# Patient Record
Sex: MALE | Race: WHITE | HISPANIC OR LATINO | Employment: FULL TIME | URBAN - METROPOLITAN AREA
[De-identification: names, ages, dates, MRNs, and addresses within clinical notes are randomized per-mention and may not be internally consistent; named-entity substitution may affect disease eponyms.]

---

## 2020-05-29 ENCOUNTER — HOSPITAL ENCOUNTER (OUTPATIENT)
Dept: LAB | Facility: MEDICAL CENTER | Age: 45
End: 2020-05-29
Attending: FAMILY MEDICINE
Payer: COMMERCIAL

## 2020-05-29 ENCOUNTER — OFFICE VISIT (OUTPATIENT)
Dept: URGENT CARE | Facility: PHYSICIAN GROUP | Age: 45
End: 2020-05-29
Payer: COMMERCIAL

## 2020-05-29 VITALS
DIASTOLIC BLOOD PRESSURE: 110 MMHG | RESPIRATION RATE: 16 BRPM | SYSTOLIC BLOOD PRESSURE: 154 MMHG | OXYGEN SATURATION: 97 % | TEMPERATURE: 98.3 F | HEART RATE: 72 BPM | WEIGHT: 239 LBS

## 2020-05-29 DIAGNOSIS — I10 ESSENTIAL HYPERTENSION: ICD-10-CM

## 2020-05-29 LAB
ALBUMIN SERPL BCP-MCNC: 4.4 G/DL (ref 3.2–4.9)
ALBUMIN/GLOB SERPL: 1.7 G/DL
ALP SERPL-CCNC: 146 U/L (ref 30–99)
ALT SERPL-CCNC: 49 U/L (ref 2–50)
ANION GAP SERPL CALC-SCNC: 13 MMOL/L (ref 7–16)
AST SERPL-CCNC: 28 U/L (ref 12–45)
BASOPHILS # BLD AUTO: 0.4 % (ref 0–1.8)
BASOPHILS # BLD: 0.03 K/UL (ref 0–0.12)
BILIRUB SERPL-MCNC: 0.8 MG/DL (ref 0.1–1.5)
BUN SERPL-MCNC: 12 MG/DL (ref 8–22)
CALCIUM SERPL-MCNC: 9 MG/DL (ref 8.5–10.5)
CHLORIDE SERPL-SCNC: 99 MMOL/L (ref 96–112)
CO2 SERPL-SCNC: 23 MMOL/L (ref 20–33)
CREAT SERPL-MCNC: 0.78 MG/DL (ref 0.5–1.4)
EOSINOPHIL # BLD AUTO: 0.08 K/UL (ref 0–0.51)
EOSINOPHIL NFR BLD: 1 % (ref 0–6.9)
ERYTHROCYTE [DISTWIDTH] IN BLOOD BY AUTOMATED COUNT: 40.8 FL (ref 35.9–50)
GLOBULIN SER CALC-MCNC: 2.6 G/DL (ref 1.9–3.5)
GLUCOSE SERPL-MCNC: 296 MG/DL (ref 65–99)
HCT VFR BLD AUTO: 42.7 % (ref 42–52)
HGB BLD-MCNC: 15 G/DL (ref 14–18)
IMM GRANULOCYTES # BLD AUTO: 0.02 K/UL (ref 0–0.11)
IMM GRANULOCYTES NFR BLD AUTO: 0.2 % (ref 0–0.9)
LYMPHOCYTES # BLD AUTO: 3.09 K/UL (ref 1–4.8)
LYMPHOCYTES NFR BLD: 38.1 % (ref 22–41)
MCH RBC QN AUTO: 32.2 PG (ref 27–33)
MCHC RBC AUTO-ENTMCNC: 35.1 G/DL (ref 33.7–35.3)
MCV RBC AUTO: 91.6 FL (ref 81.4–97.8)
MONOCYTES # BLD AUTO: 0.49 K/UL (ref 0–0.85)
MONOCYTES NFR BLD AUTO: 6 % (ref 0–13.4)
NEUTROPHILS # BLD AUTO: 4.39 K/UL (ref 1.82–7.42)
NEUTROPHILS NFR BLD: 54.3 % (ref 44–72)
NRBC # BLD AUTO: 0 K/UL
NRBC BLD-RTO: 0 /100 WBC
PLATELET # BLD AUTO: 185 K/UL (ref 164–446)
PMV BLD AUTO: 10.6 FL (ref 9–12.9)
POTASSIUM SERPL-SCNC: 3.6 MMOL/L (ref 3.6–5.5)
PROT SERPL-MCNC: 7 G/DL (ref 6–8.2)
RBC # BLD AUTO: 4.66 M/UL (ref 4.7–6.1)
SODIUM SERPL-SCNC: 135 MMOL/L (ref 135–145)
TSH SERPL DL<=0.005 MIU/L-ACNC: 0.87 UIU/ML (ref 0.38–5.33)
WBC # BLD AUTO: 8.1 K/UL (ref 4.8–10.8)

## 2020-05-29 PROCEDURE — 83036 HEMOGLOBIN GLYCOSYLATED A1C: CPT

## 2020-05-29 PROCEDURE — 99204 OFFICE O/P NEW MOD 45 MIN: CPT | Performed by: FAMILY MEDICINE

## 2020-05-29 PROCEDURE — 84443 ASSAY THYROID STIM HORMONE: CPT

## 2020-05-29 PROCEDURE — 36415 COLL VENOUS BLD VENIPUNCTURE: CPT

## 2020-05-29 PROCEDURE — 93000 ELECTROCARDIOGRAM COMPLETE: CPT | Performed by: FAMILY MEDICINE

## 2020-05-29 PROCEDURE — 80053 COMPREHEN METABOLIC PANEL: CPT

## 2020-05-29 PROCEDURE — 85025 COMPLETE CBC W/AUTO DIFF WBC: CPT

## 2020-05-29 RX ORDER — HYDROCHLOROTHIAZIDE 25 MG/1
25 TABLET ORAL DAILY
Qty: 30 TAB | Refills: 0 | Status: SHIPPED | OUTPATIENT
Start: 2020-05-29 | End: 2020-06-12 | Stop reason: SDUPTHER

## 2020-05-29 NOTE — PROGRESS NOTES
Subjective:      Chief Complaint   Patient presents with   • Hypertension Follow-up     has had a couple episodes of feeling sick, high blood pressure today                Hypertension  This is a chronic problem.   He is not really sure when it started, but he has been checking BP at home for past couple of months and readings have been elevated.        He has no hx of HTN.      Associated symptoms include : occasional nausea.          Pertinent negatives include no anxiety, blurred vision, chest pain, malaise/fatigue, orthopnea, palpitations or shortness of breath. There are no associated agents to hypertension. There are no known risk factors for coronary artery disease.         There is no history of angina, kidney disease or CAD/MI.       Social History     Socioeconomic History   • Marital status: Single     Spouse name: Not on file   • Number of children: Not on file   • Years of education: Not on file   • Highest education level: Not on file   Occupational History   • Not on file   Social Needs   • Financial resource strain: Not on file   • Food insecurity     Worry: Not on file     Inability: Not on file   • Transportation needs     Medical: Not on file     Non-medical: Not on file   Tobacco Use   • Smoking status: Former Smoker   • Smokeless tobacco: Never Used   Substance and Sexual Activity   • Alcohol use: Not on file   • Drug use: Not on file   • Sexual activity: Not on file   Lifestyle   • Physical activity     Days per week: Not on file     Minutes per session: Not on file   • Stress: Not on file   Relationships   • Social connections     Talks on phone: Not on file     Gets together: Not on file     Attends [employer] service: Not on file     Active member of club or organization: Not on file     Attends meetings of clubs or organizations: Not on file     Relationship status: Not on file   • Intimate partner violence     Fear of current or ex partner: Not on file     Emotionally abused: Not on file      Physically abused: Not on file     Forced sexual activity: Not on file   Other Topics Concern   • Not on file   Social History Narrative   • Not on file       No current outpatient medications on file prior to visit.     No current facility-administered medications on file prior to visit.          Past medical history was unremarkable and not pertinent to current issue  Family history was reviewed and not pertinent             Review of Systems   Constitutional: Negative for fever, chills and malaise/fatigue.   Eyes: Negative for vision changes, d/c.    Respiratory: Negative for cough and sputum production.    Cardiovascular: Negative for chest pain and palpitations.   Gastrointestinal: Negative for   vomiting, abdominal pain, diarrhea and constipation.   Genitourinary: Negative for dysuria, urgency and frequency.   Skin: Negative for rash or  itching.   Neurological: Negative for dizziness and tingling.   Psychiatric/Behavioral: Negative for depression.   Hematologic/lymphatic - denies bruising or excessive bleeding  All other systems reviewed and are negative.         Objective:     /110 (BP Location: Right arm, Patient Position: Sitting, BP Cuff Size: Large adult)   Pulse 72   Temp 36.8 °C (98.3 °F) (Temporal)   Resp 16   Wt 108.4 kg (239 lb)   SpO2 97%       Physical Exam   Constitutional: pt is oriented to person, place, and time. Pt appears well-developed and well-nourished. No distress.   HENT:   Head: Normocephalic and atraumatic.   Mouth/Throat:   Eyes: Conjunctivae and EOM are normal. Pupils are equal, round, and reactive to light. Right eye exhibits no discharge. Left eye exhibits no discharge. No scleral icterus.   Cardiovascular: Normal rate, regular rhythm, normal heart sounds and intact distal pulses.  Exam reveals no friction rub.    No murmur heard.  Pulmonary/Chest: Effort normal and breath sounds normal. No respiratory distress. Pt has no wheezes. Pt has no rales.   Neurological: pt is  alert and oriented to person, place, and time. No cranial nerve deficit.   Skin: Skin is warm. Pt is not diaphoretic. No erythema.   Psychiatric: pt behavior is normal.   Nursing note and vitals reviewed.         EKG interpretation - normal sinus rhythm.   + LVH noted.  No ST or QRS morphology changes. QT interval is normal.  No signs of ischemia.       Assessment/Plan:       1. Essential hypertension  Not at goal    Will start on HCTZ    Labs ordered    F/u PCP in one week        - hydroCHLOROthiazide (HYDRODIURIL) 25 MG Tab; Take 1 Tab by mouth every day.  Dispense: 30 Tab; Refill: 0  - CBC WITH DIFFERENTIAL; Future  - Comp Metabolic Panel; Future  - TSH; Future  - HEMOGLOBIN A1C; Future  - REFERRAL TO FOLLOW-UP WITH PRIMARY CARE  - EKG - Clinic Performed

## 2020-05-29 NOTE — PATIENT INSTRUCTIONS
Controle patel presión arterial  (Managing Your Hypertension)  La presión arterial es la medida de la fuerza de la rosas al presionar contra las smith de las arterias. Las arterias son tubos musculares que están dentro del sistema circulatorio. La presión arterial no es xenia. Se eleva con la actividad, la excitación o el nerviosismo y disminuye xander el sueño y la relajación. Si los valores de medición de la presión arterial se mantienen por arriba de lo normal por mucho tiempo, hay riesgo de tener problemas de annette. La presión arterial wendi (hipertensión) es shalini enfermedad de larga duración (crónica) en la que la presión arterial está elevada.   La lectura de la presión arterial se registra con dos números, por ejemplo 120 sobre 80 (o 120/80). El primer número, el más alto, es la presión sistólica. Es la medida de la presión de las arterias cuando el corazón late. El jennifer número, el más bajo, es la presión diastólica. Es la medida de la presión en las arterias cuando el corazón se relaja entre latidos.   Es importante mantener la presión arterial en un rango normal para prevenir la annette en general y otros problemas de annette, mu enfermedades del corazón e ictus. Cuando no se controla la presión arterial, el corazón trabaja más de lo normal. La hipertensión arterial es shalini enfermedad muy común en los adultos debido a que tiende a aumentar con la edad. Hombres y mujeres son igualmente propensos a tener hipertensión, lana en diferentes momentos de la phyllis. Antes de los 45 años, los hombres son más propensos a sufrir hipertensión. Después de 65 años de edad, las mujeres tienen más probabilidades de padecerla. La hipertensión es muy común en los afroamericanos. Esta enfermedad generalmente no manifiesta signos ni síntomas. Generalmente se desconoce la causa. El médico podrá indicarle un plan para mantener la presión arterial en un rango normal y saludable.   ETAPAS DE PRESIÓN ARTERIAL   La presión arterial  se clasifica en cuatro etapas: normal, prehipertensión, etapa 1 y etapa 2. Se puede leer la presión arterial para determinar qué tipo de tratamiento, si se indicara, es necesario. Las opciones apropiadas para el tratamiento están vinculadas a estas cuatro etapas:   Normal   · Presión sistólica (mm Hg): por debajo de 120.  · Presión diastólica (mm Hg): por debajo de 80.  Prehipertensión   · Presión sistólica (mm Hg): 120 a 139.  · Presión diastólica (mm Hg): 80 a 89.  Etapa1   · Presión sistólica (mm Hg): 140 a 159.  · Presión diastólica (mm Hg): 90 a 99.  Etapa2   · Presión sistólica (mm Hg): 160 o más.  · Presión diastólica (mm Hg): 100 o más.  RIESGOS RELACIONADOS CON LA PRESIÓN ARTERIAL RANJAN   Controlar la presión arterial es shalini responsabilidad importante. La hipertensión no controlada puede llevar a:   · Ataques cardíacos.  · Ictus.  · Debilitamiento de los vasos sanguíneos (aneurisma).  · Insuficiencia cardíaca.  · Daño renal.  · Daño ocular.  · Síndrome metabólico.  · Problemas de memoria y concentración.  CÓMO CONTROLAR LA PRESIÓN ARTERIAL   La presión arterial puede ser controlada efectivamente con cambios en el estilo de phyllis y de medicamentos (si es necesario). El médico le indicará un plan para bajar la presión arterial al rango normal. Abdi plan debería incluir lo siguiente:   Educación   · Alexandra toda la información proporcionada por ryan médicos acerca de cómo controlar la presión arterial.  · Infórmese sobre las últimas recomendaciones de pautas y tratamiento. Continuamente se hacen nuevas investigaciones para definir con más precisión los riesgos y los tratamientos para la hipertensión arterial.  Cambiosen el estilo de phyllis  · Control del peso.  · No fumar.  · Mantenerse físicamente activo.  · Disminuir la cantidad de sal de la dieta.  · Reducir las situaciones de estrés.  · Controlar las enfermedades crónicas, mu el colesterol alto o la diabetes.  · Reducir el consumo de alcohol.  Medicamentos  · Están  disponibles diferentes medicamentos (medicamentos antihipertensivos) para que la presión arterial quede dentro de un rango normal.  Comunicación   · Revise con patel médico todos los medicamentos que mark ya que puede guerline efectos secundarios o interacciones.  · Hable con patel médico acerca de la dieta, hábitos de ejercicio y otros factores del estilo de phyllis que pueden contribuir a la hipertensión arterial.  · Concurra regularmente a la consulta con el profesional. El médico puede ayudarle a crear y ajustar patel plan para controlar la presión arterial wendi.  RECOMENDACIONES PARA EL TRATAMIENTO Y CONTROL   Las siguientes recomendaciones se basan en las pautas actuales para controlar la hipertensión arterial en adultos no gestantes. Utilice estas recomendaciones para determinar el período de seguimiento adecuado o la opción de tratamiento basada en la lectura de patel presión arterial. Podrá conversar sobre estas opciones con patel médico.   · Presión sistólica de 120 a 139 o presión diastólica de 80 a 89: Concurra a las visitas de control, según las indicaciones.  · Presión sistólica de 140 a 160 o presión diastólica de 90 a 100: Elton shalini visita de control con el profesional dentro de dos meses.  · Presión sistólica por arriba de 160 o presión diastólica por arriba de 100: Elton shalini visita de control con el profesional dentro de un mes.  · Presión sistólica por arriba de 180 o presión diastólica por arriba de 110: Considere la posibilidad de seguir shalini terapia antihipertensiva; concurra a shalini visita de control con patel médico dentro de 1 semana.  · Presión sistólica por arriba de 200 o presión diastólica por arriba de 120: Comience el tratamiento antihipertensivo; concurra shalini visita de control con patel médico dentro de 1 semana.  Esta información no tiene mu fin reemplazar el consejo del médico. Asegúrese de hacerle al médico cualquier pregunta que tenga.  Document Released: 09/11/2013  [employer] Interactive Patient Education ©  2017 [employer]

## 2020-05-30 DIAGNOSIS — E11.9 TYPE 2 DIABETES MELLITUS WITHOUT COMPLICATION, UNSPECIFIED WHETHER LONG TERM INSULIN USE (HCC): ICD-10-CM

## 2020-05-30 LAB
EST. AVERAGE GLUCOSE BLD GHB EST-MCNC: 246 MG/DL
HBA1C MFR BLD: 10.2 % (ref 0–5.6)

## 2020-05-30 NOTE — PROGRESS NOTES
Labs reviewed:       Pt is diabetic - A1c is elevated.       Referred to endocrinology for treatment.   Hopefully he can be seen soon.     In the meantime, Please advise pt to come to urgent care sooner for:   Increased thirst, confusion/disorientation,  very frequent urination, severe fatigue

## 2020-06-03 ENCOUNTER — TELEPHONE (OUTPATIENT)
Dept: URGENT CARE | Facility: CLINIC | Age: 45
End: 2020-06-03

## 2020-06-03 NOTE — TELEPHONE ENCOUNTER
Please call pt:      Labs reviewed:       Pt is diabetic - A1c is elevated.       Referred to endocrinology for treatment.   Hopefully he can be seen soon.     In the meantime, Please advise pt to come to urgent care sooner for:   Increased thirst, confusion/disorientation,  very frequent urination, severe fatigue

## 2020-06-04 NOTE — TELEPHONE ENCOUNTER
Spoke with pt's daughter regarding referrals and what the process should be. Pt and daughter understood. They will call Endo and Primary to try and schedule ASAP.

## 2020-06-09 ENCOUNTER — TELEPHONE (OUTPATIENT)
Dept: SCHEDULING | Facility: IMAGING CENTER | Age: 45
End: 2020-06-09

## 2020-06-12 ENCOUNTER — OFFICE VISIT (OUTPATIENT)
Dept: MEDICAL GROUP | Facility: MEDICAL CENTER | Age: 45
End: 2020-06-12
Payer: COMMERCIAL

## 2020-06-12 VITALS
RESPIRATION RATE: 16 BRPM | HEART RATE: 96 BPM | HEIGHT: 71 IN | TEMPERATURE: 98.5 F | WEIGHT: 235.23 LBS | BODY MASS INDEX: 32.93 KG/M2 | SYSTOLIC BLOOD PRESSURE: 126 MMHG | DIASTOLIC BLOOD PRESSURE: 80 MMHG | OXYGEN SATURATION: 97 %

## 2020-06-12 DIAGNOSIS — Z11.4 SCREENING FOR HIV (HUMAN IMMUNODEFICIENCY VIRUS): ICD-10-CM

## 2020-06-12 DIAGNOSIS — I10 ESSENTIAL HYPERTENSION: ICD-10-CM

## 2020-06-12 DIAGNOSIS — R74.8 ELEVATED ALKALINE PHOSPHATASE LEVEL: ICD-10-CM

## 2020-06-12 DIAGNOSIS — E11.9 TYPE 2 DIABETES MELLITUS WITHOUT COMPLICATION, WITHOUT LONG-TERM CURRENT USE OF INSULIN (HCC): ICD-10-CM

## 2020-06-12 DIAGNOSIS — Z13.21 ENCOUNTER FOR VITAMIN DEFICIENCY SCREENING: ICD-10-CM

## 2020-06-12 DIAGNOSIS — B35.3 TINEA PEDIS OF BOTH FEET: ICD-10-CM

## 2020-06-12 DIAGNOSIS — Z23 NEED FOR VACCINATION: ICD-10-CM

## 2020-06-12 DIAGNOSIS — E66.9 CLASS 1 OBESITY WITHOUT SERIOUS COMORBIDITY WITH BODY MASS INDEX (BMI) OF 33.0 TO 33.9 IN ADULT, UNSPECIFIED OBESITY TYPE: ICD-10-CM

## 2020-06-12 PROBLEM — E66.811 CLASS 1 OBESITY WITH BODY MASS INDEX (BMI) OF 33.0 TO 33.9 IN ADULT: Status: ACTIVE | Noted: 2020-06-12

## 2020-06-12 PROCEDURE — 90472 IMMUNIZATION ADMIN EACH ADD: CPT | Performed by: INTERNAL MEDICINE

## 2020-06-12 PROCEDURE — 99204 OFFICE O/P NEW MOD 45 MIN: CPT | Mod: 25 | Performed by: INTERNAL MEDICINE

## 2020-06-12 PROCEDURE — 90715 TDAP VACCINE 7 YRS/> IM: CPT | Performed by: INTERNAL MEDICINE

## 2020-06-12 PROCEDURE — 90471 IMMUNIZATION ADMIN: CPT | Performed by: INTERNAL MEDICINE

## 2020-06-12 PROCEDURE — 90732 PPSV23 VACC 2 YRS+ SUBQ/IM: CPT | Performed by: INTERNAL MEDICINE

## 2020-06-12 RX ORDER — GLIPIZIDE 5 MG/1
5 TABLET ORAL DAILY
Qty: 90 TAB | Refills: 0 | Status: SHIPPED | OUTPATIENT
Start: 2020-06-12 | End: 2020-08-10

## 2020-06-12 RX ORDER — LANCETS 30 GAUGE
EACH MISCELLANEOUS
Qty: 100 EACH | Refills: 2 | Status: SHIPPED | OUTPATIENT
Start: 2020-06-12 | End: 2020-07-14 | Stop reason: SDUPTHER

## 2020-06-12 RX ORDER — KETOCONAZOLE 20 MG/G
1 CREAM TOPICAL DAILY
Qty: 1 TUBE | Refills: 1 | Status: SHIPPED | OUTPATIENT
Start: 2020-06-12 | End: 2020-06-29

## 2020-06-12 RX ORDER — HYDROCHLOROTHIAZIDE 25 MG/1
25 TABLET ORAL DAILY
Qty: 30 TAB | Refills: 0 | Status: SHIPPED | OUTPATIENT
Start: 2020-06-12 | End: 2020-07-14

## 2020-06-12 RX ORDER — GLUCOSAMINE HCL/CHONDROITIN SU 500-400 MG
CAPSULE ORAL
Qty: 100 EACH | Refills: 2 | Status: SHIPPED | OUTPATIENT
Start: 2020-06-12 | End: 2021-01-14 | Stop reason: SDUPTHER

## 2020-06-12 ASSESSMENT — PATIENT HEALTH QUESTIONNAIRE - PHQ9: CLINICAL INTERPRETATION OF PHQ2 SCORE: 0

## 2020-06-12 ASSESSMENT — FIBROSIS 4 INDEX: FIB4 SCORE: .972972972972972973

## 2020-06-12 NOTE — ASSESSMENT & PLAN NOTE
DASH diet  HCTZ  Monitor BP  Denies any chest pain, shortness of breath, leg swelling, lightheadedness, dizziness.

## 2020-06-12 NOTE — PATIENT INSTRUCTIONS
Diabetes, cuide la annette de patel corazón y ryan vasos sanguíneos  (Diabetes, Keeping Your Heart and Blood Vessels Healthy)  Demasiada glucosa (azúcar) en la rosas xander un tiempo prolongado, puede traerle problemas. El nivel de glucosa elevado puede dañar muchas partes del organismo, mu el corazón, los vasos sanguíneos y los riñones. La enfermedad del corazón y de los vasos sanguíneos puede ocasionar infartos e ictus, shalini de las causas principales de muerte en las personas con diabetes. Hay varias cosas que puede hacer para disminuir o evitar las complicaciones de la diabetes.  ¿CUÁL ES LA FUNCIÓN DEL CORAZÓN Y DE LOS VASOS SANGUÍNEOS?  El corazón y los vasos sanguíneos rebecca el aparato circulatorio. El corazón es un gran músculo que bombea la rosas a través del cuerpo. El corazón bombea la rosas, y ésta lleva el oxígeno a los vasos sanguíneos más grandes, denominados arterias, y a los pequeños vasos, que llamamos capilares. Otros vasos sanguíneos, las venas, llevan la rosas nuevamente hasta el corazón  ¿CÓMO SE OBSTRUYEN LOS VASOS SANGUÍNEOS?  Varias cosas, entre ellas la diabetes, pueden hacer que patel nivel de colesterol en rosas aumente. El colesterol es shalini sustancia que forma el organismo y que se utiliza para muchas funciones importantes. También se encuentra en algunos alimentos de origen animal. Cuando el colesterol está demasiado elevado el interior de los grandes vasos sanguíneos se estrecha, y hasta se obstruye. Los vasos sanguíneos que se traylor estrechado y bloqueado hacen que sea más difícil el paso de la rosas hacia otras partes del organismo. Kristy trastorno se denomina aterosclerosis.  ¿QUÉ OCURRE CUANDO LOS VASOS SANGUÍNEOS SE OBSTRUYEN?  Cuando las arterias se estrechan y se obstruyen, puede tener problemas cardíacos y tiene mayor riesgo de sufrir ataques cardíacos o ictus.  · La angina ocasiona dolor en el pecho, brazos, hombros o espalda. Puede sentir el dolor cuando patel corazón late rápido,  mu cuando hace ejercicios. El dolor puede desaparecer con el descanso. También podrá sentirse muy débil y sudoroso. Si no comienza con un tratamiento, el dolor aparecerá más a menudo. Si la diabetes ha dañado los nervios del corazón, podría no sentir dolor en el pecho.   · Ataques cardíacos. Un infarto se produce cuando un vaso sanguíneo que se encuentra en el corazón o cerca de éste, se obstruye. Shalini parte del músculo cardíaco no recibe la cantidad de rosas suficiente y la asha se ve privada de oxìgeno, por lo tanto se produce un daño permanente.   ¿CUÁLES SON LAS SEÑALES DE ALERTA DE UN INFARTO?  Usted puede tener shalini o más de las siguientes señales de alerta:  · Dolor o molestias en el pecho   · Dolor o molestias en los brazos, espalda, mandíbula o favian.   · Indigestión o dolor de estómago   · Falta de aire.   · Sudoración   · Náuseas o vómitos.   · Mareos   · También puede ocurrir que no tenga ninguna señal de alerta, o que aparezca y se vaya.   ¿DE QUÉ MODO LA ENFERMEDAD CARDÍACA PRODUCE PRESIÓN ARTERIAL ELEVADA?  Los vasos sanguíneos que se traylor estrechado maria dolores shalini pequeña abertura para que la rosas fluya Es mu abrir shalini manguera y mantener el pulgar sobre la abertura La abertura pequeña hace que el agua salga con más presión Del mismo modo, los vasos sanguíneos que se traylor estrechado conducen a un aumento de la presión arterial. Otros factores, mu problemas renales y el sobrepeso, también pueden conducir a un aumento de la presión arterial  Muchas personas diabéticas también tienen la presión arterial elevada. Si usted tiene problemas en el corazón, en la vista o en los riñones debido a la diabetes, la presión arterial elevada puede empeorarlos.  Si tiene la presión arterial elevada, consulte al profesional que lo asiste sobre mu bajarla. El profesional podrá aconsejarle que tome un medicamento para la presión todos los días. Algunos tipos de medicamentos para la presión arterial mantendrán ryan  riñones sanos.  Para disminuir la presión arterial, el profesional también podrá recomendarle que baje de peso, que consuma más frutas y verduras, menos sal y alimentos ricos en sodio, mu sopas enlatadas, fiambres, colaciones que contengan mucha sal y comidas rápidas, y que sima menos alcohol.  ¿CUÁLES SON LAS SEÑALES DE ALERTA DE UN ICTUS?  El ictus se produce cuando shalini parte del cerebro no recibe la rosas suficiente y sonny de funcionar. Según la parte del cerebro que se dañe, el ictus puede causar:  · Debilidad o adormecimiento súbito de la juana, el brazo o la pierna, de un lado del cuerpo.   · Confusión súbita, problemas para hablar o para comprender.   · Mareo, falta de equilibrio o problemas para caminar   · Dificultad para la visión en zaire o en ambos ojos o visión doble.   · Dolor de jc repentino e intenso   Muchas veces, zaire o más de estos signos de alerta pueden presentarse y desaparecer. Podría tener un ataque isquémico transitorio (AIT). Si presenta alguno de estos síntomas, consulte inmediatamente con el profesional que lo asiste.  ¿DE QUÉ MODO SHALINI OBSTRUCCIÓN EN LOS VASOS SANGUÍNEOS PUEDE LESIONAR LAS PIERNAS Y LOS PIES?  La enfermedad vascular periférica puede aparecer cuando la katy de los vasos sanguíneos se estrecha y no pasa suficiente cantidad de rosas hacia las piernas y los pies. Puede sentir dolor en los glúteos, la parte posterior de las piernas o en los muslos al estar parado, caminar o practicar ejercicios. En algunos casos será necesario someterse a shalini cirugía.  ¿QUÉ PUEDO HACER PARA MANTENER LOS VASOS SANGUÍNEOS SANOS Y PREVENIR LA ENFERMEDAD CARDÍACA Y EL ICTUS?  · Mantenga el nivel de glucosa en rosas bajo control. Probablemente el médico le indique un análisis de rosas A1c al menos dos veces al año. La prueba A1c informa cuál fue el promedio del nivel de glucosa en rosas en los últimos 2 ó 3 meses y puede darle información valiosa acerca del control global de patel diabetes.  "  · Mantenga la presión arterial bajo control. Elton que se la controlen en cada consulta médica. Si mark medicamentos para controlar la presión arterial, tómelos exactamente según se los traylor prescripto. Para la mayor parte de las personas, el objetivo es tener menos de 130/80.   · Mantenga el colesterol bajo control. Contrólelo al menos shalini vez al año. El objetivo para la mayor parte de las personas es   · LDL colesterol (rob) menos de 100 mg/dl.   · HDL colesterol (armas) más de 40 mg/dl en los hombres y más de 50 mg/dl en las mujeres.   · Triglicéridos (otro tipo de grasa en la rosas): menos de 150 mg/dl.   · Practique actividad física mu parte de patel rutina diaria. [place]ton al menos 30 minutos de ejercicio la mayor parte de los días de la semana. Pregúntele a patel médico cuáles son las actividades más recomendables para usted.   · Compruebe que los alimentos que consume son \"saludables para el corazón\" Incluya alimentos ricos en fibra, mu salvado, alana, panes integrales, y cereales, frutas y verduras. No consuma alimentos que contengan grasas saturadas o colesterol, mu suzie, mantequilla, productos lácteos enteros, huevos, materias grasas, manteca de cerdo y alimentos con aceite de mahan o de farzaneh.   · Mantenga un peso saludable. Si tiene sobrepeso, trate de practicar ejercicios la mayor parte de la semana. Consulte con un nutricionista matriculado para obtener ayuda acerca de la planificación de las comidas y la disminución de los contenidos de grasas y calorías.   · Si fuma, deje de fumar. El profesional que lo asiste podrá aconsejarle algún modo para dejar de fumar.   · Consulte con el profesional si debe suraj shalini aspirina todos los días. Algunos estudios traylor demostrado que suraj shalini dosis baja de aspirina todos los días puede ayudarlo a reducir el riesgo de sufrir enfermedad cardíaca e ictus.   · [place] la medicación mu se le indicó.   SOLICITE ATENCIÓN MÉDICA SI:  · Si tiene alguna de las señales de " "alerta de ataque cardíaco o ictus.   · Siente dolor en las piernas o en los pies al caminar.   · Siente que los pies o las piernas están fríos o los siente fríos al tacto.   PARA OBTENER MÁS INFORMACIÓN  · Instructor para el cuidado de la diabetes (enfermeros, dietistas, farmacéuticos y otros profesionales de la annette)   · Para encontrar un instructor para el cuidado de la diabetes cercano a patel domicilio, llame a la American Association of Diabetes Educators AADE(Asociación Norteamericana de Instructores para el cuidado de la Diabetes) en el número gratuito 4-687-OJBEFC6 ([phone]), o en Internet al www.diabeteseducator.org y viry peralta en \"Find a Diabetes Educator\".   · Nutricionistas   · Para encontrar un nutricionista cercano a patel domicilio, llame a la American Dietetic Association (Asociación de Nutricionistas Norteamericanos) al número gratuito 9-542-263-3434, o en Internet al www.diabeteseducator.org y viry peralta en \"Find a Nutrition Profesional\".(\"Encuentre un nutricionista\")   · Institutos gubernamentales   · El National Heart, Lung, and Blood [place] (NHLBI) (Instituto Nacional para el Corazón, Pulmones y Gelacio) es parte de los National Institutes of Health (Institutos Nacionales de [place]) Para conocer más sobre los problemas relacionados con el corazón y los vasos sanguíneos escriba o comuníquese con el [facility] NHLBI [facility], [address] [address], [place] [address], [phone]; o en la página web www.nhlbi.nih.gov en Internet.   · Para obtener más información acerca del cuidado de la diabetes, puede contactarse con:   National Diabetes Information Clearinghouse  [address]  [facility] [place] [address]  Teléfono: [phone] or [phone]  Fax: [phone]  Email: jasper@info.niddk.nih.gov  Internet: www.diabetes.niddk.nih.gov  National Diabetes Education Program (Programa Nacional de Educación sobre la Diabetes)  Informaciones  [place] " [address]  Teléfono: [phone]  Fax: [phone]  Internet: http://[facility].nih.gov  American Diabetes Association (Asociación Norteamericana para la Diabetes)  [address] [facility]  [place] [address]  Teléfono: [phone]  Internet: www.diabetes.org  Juvenile Diabetes Research Foundation International (Fundación Internacional de Investigación de la Diabetes Juvenil)  [address] [place] [address]  Teléfono: [phone]  Internet: www.jdrf.org  Document Released: 12/18/2006 Document Revised: 03/11/2013  ExitCare® Patient Information ©2013 [employer].    Diabetes y cuidados del pie  (Diabetes and Foot Care)  La diabetes puede ser la causa de que el flujo sanguíneo (circulación) en las piernas y los pies sea deficiente. Debido a esto, la piel de los pies se torna más delgada, se rompe con facilidad y se kathleen más lentamente. La piel puede estar seca, despellejarse y agrietarse. También pueden estar dañados los nervios de las piernas y de los pies lo que provoca shalini disminución de la sensibilidad. Es posible que no advierta heridas más pequeñas en los pies, que pueden causar infecciones graves. Cuidar ryan pies es shalini de las cosas más importantes que puede hacer por usted mismo.  INSTRUCCIONES PARA EL CUIDADO EN EL HOGAR  · Use siempre calzado, aún dentro de patel casa. No camine descalzo. Caminar descalzo facilita que se lastime.  · Controle ryan pies diariamente para observar ampollas, pisano y enrojecimiento. Si no puede florian la planta del pie, use un young o pídale ayuda a otra persona.  · Lave ryan pies con agua tibia (no use [place]) y un jabón suave. Seque kendra ryan pies, y la asha entre los dedos dando palmaditas, hasta que estén completamente secos. Noremoje los pies, ya que esto puede resecar la piel.  · Aplique shalini loción hidratante o vaselina (que no contenga alcohol ni perfume) en los pies y en las uñas secas y quebradizas. No aplique loción entre los dedos.  · Recorte  las uñas en forma recta. No escarbe debajo de las uñas o alrededor de las cutículas. Lime los bordes de las uñas con shalini lima o esmeril.  · No james las durezas o callosidades, ni trate de quitarlas con medicamentos.  · Use calcetines de algodón o medias limpias todos los días. Asegúrese de que no le ajusten demasiado. Nouse calcetines que le lleguen a las rodillas, ya que podrían disminuir el flujo de rosas a las piernas.  · Use zapatos de cuero que le queden kendra y que sunday acolchados. Para amoldar los zapatos, cálcelos sólo algunas horas por día. [place] evitará lesiones en los pies. Revise siempre los zapatos antes de ponerlos para asegurarse de que no haya objetos en patel interior.  · No cruce las piernas. [place] puede disminuir el flujo de rosas a los pies.  · Si algo le ha raspado, cortado o lastimado la piel de los pies, mantenga la piel de jose manuel asha limpia y seca. Debe higienizar estas zonas con agua y un jabón suave. No limpie la asha con agua oxigenada, alcohol ni yodo.  · Cuando se quite un vendaje adhesivo, asegúrese de no dañar la piel.  · Si tiene shalini herida, obsérvela varias veces por día para asegurarse de que se está curando.  · No use bolsas de [place] ni almohadillas térmicas. Podrían causar quemaduras. Si ha perdido la sensibilidad en los pies o las piernas, no sabrá lo que le está sucediendo hasta que sea demasiado tarde.  · Asegúrese de que patel médico le viry un examen completo de los pies por lo menos shalini vez al año, o con más frecuencia si usted tiene problemas en los pies. Informe todos los pisano, llagas o moretones a patel médico inmediatamente.  SOLICITE ATENCIÓN MÉDICA SI:  · Tiene shalini lesión que no se kathleen.  · Tiene pisano o rajaduras en la piel.  · Tiene shalini uña encarnada.  · Nota shalini asha irritada en las piernas o los pies.  · Siente shalini sensación de ardor u hormigueo en las piernas o los pies.  · Siente dolor o calambres en las piernas o los pies.  · Las piernas o los pies están  adormecidos.  · Siente los pies siempre fríos.  SOLICITE ATENCIÓN MÉDICA DE INMEDIATO SI:  · Presenta enrojecimiento, hinchazón o aumento del dolor en shalini herida.  · Nota shalini línea bandar que sube por pierna.  · Aparece pus en la herida.  · Le sube la fiebre o según lo que le indique el médico.  · Advierte un olor fétido que proviene de shalini úlcera o shalini herida.  Esta información no tiene mu fin reemplazar el consejo del médico. Asegúrese de hacerle al médico cualquier pregunta que tenga.  Document Released: 12/18/2006 Document Revised: 04/10/2017 Document Reviewed: 05/27/2014  "[employer] Patient Education © 2017 [employer]      Diabetes y actividad física  (Diabetes and Exercise)  La diabetes mellitus es shalini enfermedad crónica y bastante frecuente, en la cual el páncreas no puede controlar adecuadamente los niveles de azúcar en la rosas. Hay dos tipos de diabetes: Los pacientes que sufren diabetes tipo I no producen insulina, la hormona que permite el almacenamiento en el organismo del azúcar en la rosas. Estas personas pueden compensar esta dificultad aplicándose inyecciones de insulina. En la diabetes tipo II, no está comprometida la producción de cantidades adecuadas de insulina para controlar nos niveles de azúcar en la rosas. Las personas que sufren diabetes tipo II controlan el nivel de azúcar con la ingesta de alimentos o con medicamentos. La actividad física es shalini parte importante del tratamiento. [place] el ejercicio, los músculos utilizan gran cantidad del azúcar (glucosa) que se encuentra en la rosas, para generar energía. [place] disminuye el nivel de azúcar en la rosas (tiene el mismo efecto que suraj insulina). Se ha demostrado que las personas que practican deportes de resistencia son más sensibles a la insulina que las personas sedentarias.  SÍNTOMAS  Muchas personas que padecen diabetes leve, no tienen síntomas. Sin embargo, si no se controla, la diabetes puede traer complicaciones  graves que podrían evitarse con el tratamiento.  Entre los síntomas generales de la diabetes se incluyen:   · Ganas frecuentes de orinar (poliuria).  · Aumento de la sed (polidipsia).  · Mayor consumo de alimentos (polifagia).  · Fatiga.  · Rendimiento atlético deficiente.  · Visión borrosa.  · Inflamación de la vagina (vaginitis) debido a infecciones por hongos.  · Infecciones de la piel (poco frecuentes).  · Adormecimiento de los pies (por lesiones nerviosas).  · Enfermedades renales.  CAUSAS  En la mayor parte de los casos, la causa es desconocida. En los niños, la diabetes tiene patel causa en la respuesta autoinmune de las células del páncreas que producen insulina. También se asocia a otras enfermedades, mu la fibrosis quística. Puede tener origen genético.  PREVENCIÓN  · Los atletas deben esforzarse por comenzar la práctica de ejercicios con el nivel de azúcar en rosas kendra controlado.  · Los pies deben mantenerse siempre limpios y secos.  · Deben evitarse las actividades en las que los niveles de azúcar en rosas no pueden tratarse fácilmente (buceo, alpinismo, natación).  · Anticipe las alteraciones en la dieta o en el entrenamiento para evitar un nivel bajo (hipoglucemia) o alto (hiperglucemia) de azúcar en la rosas.  · Los deportistas deben tratar de aumentar el consumo de azúcar luego de ejercicios extenuantes para evitar la hipoglucemia.  · La insulina de acción breve no debe inyectarse en un músculo que se ejercita activamente. El atleta debe hacer reposar la asha de la inyección xander al menos 1 hora después del ejercicio.  · Los pacientes con diabetes deben realizarse controles de rutina de los pies para evitar complicaciones.  PRONÓSTICO  La actividad física proporciona muchos beneficios a las personas con diabetes:   · Disminución de la grasa corporal.  · Disminución de la presión arterial.  · Con frecuencia, se reduce la necesidad de administrar medicamentos.  · Aumento de la tolerancia a los  ejercicios.  · Niveles de insulina más bajos.  · Pérdida de peso.  · Mejoramiento del perfil lipídico (disminución del colesterol y de las lipoproteínas de baja densidad).  COMPLICACIONES RELACIONADAS  Si no se realiza correctamente, un ejercicio puede traer complicaciones a shalini persona diabética.   · Control deficiente del azúcar en rosas al realizar el ejercicio en un momento inoportuno.  · Aumento de los problemas renales debido a la deshidratación.  · Aumento de las lesiones en los nervios (neuropatías) cuando se realizan ejercicios que incrementan los traumatismos en el pie.  · Aumento del riesgo de los problemas oculares al contener la respiración y al realizar actividades en las que se deba bajar o tensionar la jc.  · Aumento del riesgo de muerte súbita relacionada con la práctica de actividad física en aquellos pacientes que sufren enfermedades cardiovasculares.  · Se favorece la presión arterial elevada (hipertensión) al levantar objetos pesados (más de 5 Kg.). Alteración de las dosis de azúcar e insulina mu resultado de un trastorno leve que causa pérdida del apetito.  · Alteración de la absorción de insulina luego de la inyección, cuando se cambia el sitio de colocación de la misma.  NOTA: La actividad física puede disminuir el nivel de azúcar en la rosas de manera efectiva, lana los efectos tienen poca duración (no más de un par de días). Se ha demostrado que la actividad física mejora la sensibilidad a la insulina. Por lo tanto la respuesta a shalini dosis determinada de insulina puede alterarse. Es importante que los pacientes diabéticos conozcan el modo en que patel organismo reacciona a la actividad física y ajusten las dosis de insulina correctamente.  TRATAMIENTO  · Coma algo entre 1 y 3 horas antes de practicar actividad física.  · Controle el nivel de azúcar en rosas inmediatamente antes de cada ejercicio.  · Detenga la actividad física si el nivel de azúcar es de más de 250 mg/dL.  · Detenga  la actividad física si el nivel de azúcar es de menos de 100 mg/dL.  · No viry ejercicios dentro de la hora de aplicada la inyección de insulina.  · Esté preparado para tratar el bajo nivel de azúcar mientras practica el ejercicio. Lleve con usted algún producto que contenga azúcar (mu caramelos).  · Si debe practicar ejercicios xander un tiempo prolongado, use bebidas para deporte para mantener el nivel de glucosa.  · Reponga la glucosa de patel organismo después del ejercicio.  · Consuma líquidos xander y después de cada ejercicio para evitar la deshidratación.  CONSULTE AL PROFESIONAL QUE LO ASISTE SI:  · Experimenta cambios en la visión luego de shalini almeida.  · Experimenta pérdida de la sensibilidad en los pies luego de los ejercicios.  · Aumenta el adormecimiento, el hormigueo o tiene sensación de pinchazos luego de practicar actividad física.  · Experimenta dolor en el pecho xander o después de los ejercicios.  · Siente latidos cardíacos acelerados en el pecho (palpitaciones) xander o después de los ejercicios.  · La tolerancia a los ejercicios disminuye.  · Experimenta desmayos, mareos o pérdida de la conciencia xander breves períodos después o xander la práctica de la actividad.     Esta información no tiene mu fin reemplazar el consejo del médico. Asegúrese de hacerle al médico cualquier pregunta que tenga.     Document Released: 10/04/2007 Document Revised: 03/11/2013  [employer] Interactive Patient Education ©2016 [employer]    Diabetes, preguntas más frecuentes  (Diabetes, Frequently Asked Questions)  ¿QUÉ ES LA DIABETES?  La mayor parte de los alimentos que consumimos se transforman en glucosa (azúcar), que es utilizada por el cuerpo para generar energía. El páncreas, un órgano que se encuentra cerca del estómago, produce shalini hormona llamada insulina para facilitar el transporte de la glucosa hacia el interior de las células del organismo. Cuando se sufre de diabetes, el organismo no produce  suficiente insulina o no puede utilizarla adecuadamente. [place] hace que el azúcar se acumule en la rosas.  ¿CUÁLES SON LOS SÍNTOMAS DE LA DIABETES?  · Necesidad frecuente de orinar   · Sed excesiva.   · Pérdida de peso sin causa aparente.   · Hambre excesiva.   · Visión borrosa.   · Hormigueo o adormecimiento de las madai y los pies.   · Cansancio extremo la mayor parte del tiempo.   · Piel seca y que pica.   · Úlceras que tardan mucho en curarse.   · Infección por hongos.   ¿CUÁLES SON LOS TIPOS DE DIABETES?  Diabetes tipo 1  · Aproximadamente un 10% de las personas afectadas sufren kenia tipo de diabetes.   · Suele aparecer antes de los 30 años.   · Suele ocurrir en personas con peso normal.   Diabetes tipo 2  · Aproximadamente un 90% de las personas afectadas sufren kenia tipo de diabetes.   · Suele aparecer después de los 40 años.   · Suele ocurrir en personas con sobrepeso.   · Más probabilidades si tiene:   · Antecedentes familiares de diabetes.   · Historial de diabetes xander el embarazo (diabetes gestacional).   · Hipertensión arterial.   · Colesterol alto y triglicéridos.   Diabetes gestacional  · Se presenta en el 4% de los embarazos.   · Por lo general desaparece shalini vez que ha nacido el bebé.   · Suele ocurrir en mujeres con:   · Antecedentes familiares de diabetes.   · Diabetes gestacional previa.   · Obesidad.   · Mayores de 25 años.   ¿QUÉ ES LA PRE-DIABETES?  Pre-diabetes significa que patel nivel de glucosa en rosas es mayor que lo normal, lana no lo suficiente mu para diagnosticar diabetes. También significa que usted está en riesgo de padecer diabetes tipo 2 y enfermedad cardíaca. Si se le diagnostica pre-diabetes, deberá controlarse la glucosa en rosas nuevamente dentro de 1 o 2 años.  ¿CÓMO SE REALIZA EL TRATAMIENTO PARA LA DIABETES?   El tratamiento está dirigido a mantener los niveles de glucosa (azúcar) de la rosas cerca de los valores normales en todo momento. En el tratamiento de la  diabetes, es muy importante el entrenamiento para el autocontrol de la enfermedad. Según el tipo de diabetes que tenga, patel tratamiento incluirá zaire o más de las indicaciones siguientes :  · Control de la glucosa en rosas.   · Planificación de los alimentos.   · Práctica de ejercicios.   · Medicamentos por vía oral (píldoras) o insulina.   ¿PUEDE PREVENIRSE LA DIABETES?  En la diabetes de tipo 1, la prevención es más difícil, debido a que no se conoce cuáles pueden ser los disparadores  En la diabetes tipo 2, la prevención es más fácil, si realiza cambios en el estilo de phyllis:  · Mantener un peso corporal adecuado.   · [place] aisha.   · La práctica de ejercicios.   ¿EXISTE SHALINI ALYSON PARA LA DIABETES?  No hay alyson para la diabetes. Se investiga de manera continua en búsqueda de shalini alyson, y se traylor realizado progresos. Kristy trastorno puede tratarse y controlarse. Las personas con diabetes pueden controlarla y llevar shalini phyllis normal y activa.  ¿DEBERÍA HACERME UN CONTROL PARA LA DIABETES?  Si tiene más de 45 años, debe realizarse un control de diabetes. Deberá volver a realizarlo cada 3 años. Si tiene 45 años o más y tiene sobrepeso es muy recomendable que se realice un control con mayor frecuencia. Si tiene menos de 45 años, sobrepeso, y tiene zaire o más factores de riesgo, deberá considerar:  · Antecedentes familiares de diabetes.   · Estilo de phyllis sedentario   · Hipertensión arterial.   ¿EXISTEN OTRAS FORRESTER DE INFORMACIÓN DE LA DIABETES?   Las siguientes organizaciones pueden resultarle útiles en patel búsqueda de información sobre la diabetes:  National Diabetes Education Program (Programa Nacional de Educación sobre la Diabetes)  Internet: http://www.ndep.nih.gov/resources  American Association of Diabetes Educators (Asociación Norteamericana de Educadores para la Diabetes)  Internet: http://www.aadenet.org  Juvenile Diabetes Foundation International (Fundación Internacional para la Diabetes Juvenil)  Internet:  http://www.jdf.org  Document Released: 12/18/2006 Document Revised: 03/11/2013  ExitCare® Patient Information ©2013 [employer].    La diabetes mellitus y los alimentos  (Diabetes Mellitus and Food)  Es importante que controle patel nivel de azúcar en la rosas (glucosa). El nivel de glucosa en rosas depende en gran medida de lo que usted come. [place] alimentos saludables en las cantidades adecuadas a lo ayana del día, aproximadamente a la misma hora todos los días, lo ayudará a controlar patel nivel de glucosa en rosas. También puede ayudarlo a retrasar o evitar el empeoramiento de la diabetes mellitus. [place] de manera saludable incluso puede ayudarlo a mejorar el nivel de presión arterial y a alcanzar o mantener un peso saludable.  Entre las recomendaciones generales para alimentarse y cocinar los alimentos de forma saludable, se incluyen las siguientes:  · Respetar las comidas principales y comer colaciones con regularidad. Evitar pasar largos períodos sin comer con el fin de perder peso.  · Seguir shalini dieta que consista principalmente en alimentos de origen vegetal, mu frutas, vegetales, claire secos, legumbres y cereales integrales.  · Utilizar métodos de cocción a baja temperatura, mu hornear, en lugar de métodos de cocción a wendi temperatura, mu freír en abundante aceite.  Trabaje con el nutricionista para aprender a usar la información nutricional de las etiquetas de los alimentos.  ¿CÓMO PUEDEN AFECTARME LOS ALIMENTOS?  Carbohidratos   Los carbohidratos afectan el nivel de glucosa en rosas más que cualquier otro tipo de alimento. El nutricionista lo ayudará a determinar cuántos carbohidratos puede consumir en cada comida y enseñarle a contarlos. El recuento de carbohidratos es importante para mantener la glucosa en rosas en un nivel saludable, en especial si utiliza insulina o mark determinados medicamentos para la diabetes mellitus.  Alcohol   El alcohol puede provocar disminuciones súbitas de la  glucosa en rosas (hipoglucemia), en especial si utiliza insulina o mark determinados medicamentos para la diabetes mellitus. La hipoglucemia es shalini afección que puede poner en peligro la phyllis. Los síntomas de la hipoglucemia (somnolencia, mareos y desorientación) son similares a los síntomas de guerline consumido mucho alcohol.  Si el médico lo autoriza a beber alcohol, hágalo con moderación y siga estas pautas:  · Las mujeres no deben beber más de un trago por día, y los hombres no deben beber más de dos tragos por día. Un trago es igual a:  ¨ 12 onzas (355 ml) de cerveza  ¨ 5 onzas de vino (150 ml) de vino  ¨ 1,5 onzas (45 ml) de bebidas espirituosas  · No sima con el estómago vacío.  · Manténgase hidratado. Sima agua, gaseosas dietéticas o té helado sin azúcar.  · Las gaseosas comunes, los jugos y otros refrescos podrían contener muchos carbohidratos y se deben contar.  ¿QUÉ ALIMENTOS NO SE RECOMIENDAN?  Cuando viry las elecciones de alimentos, es importante que recuerde que todos los alimentos son distintos. Algunos tienen menos nutrientes que otros por porción, aunque podrían tener la misma cantidad de calorías o carbohidratos. Es difícil darle al cuerpo lo que necesita cuando consume alimentos con menos nutrientes. Estos son algunos ejemplos de alimentos que debería evitar ya que contienen muchas calorías y carbohidratos, lana pocos nutrientes:  · Grasas trans (la mayoría de los alimentos procesados incluyen grasas trans en la etiqueta de Información nutricional).  · Gaseosas comunes.  · Jugos.  · Caramelos.  · Dulces, mu tortas, pasteles, rosquillas y galletas.  · Comidas fritas.  ¿QUÉ ALIMENTOS PUEDO COMER?  Consuma alimentos ricos en nutrientes, que nutrirán el cuerpo y lo mantendrán saludable. Los alimentos que debe comer también dependerán de varios factores, mu:  · Las calorías que necesita.  · Los medicamentos que mark.  · Abdi peso.  · El nivel de glucosa en rosas.  · El nivel de presión  arterial.  · El nivel de colesterol.  Debe consumir shalini amplia variedad de alimentos, por ejemplo:  · Proteínas.  ¨ Franks de carne magros.  ¨ Proteínas con bajo contenido de grasas saturadas, mu pescado, lior de huevo y frijoles. Evite las suzie procesadas.  · Frutas y vegetales.  ¨ Frutas y vegetales que pueden ayudar a controlar los niveles sanguíneos de glucosa, mu manzanas, mangos y batatas.  · Productos lácteos.  ¨ Elija productos lácteos sin grasa o con bajo contenido de grasa, mu leche, yogur y queso.  · Cereales, panes, pastas y arroz.  ¨ Elija cereales integrales, mu panes multicereales, alana en grano y arroz integral. Estos alimentos pueden ayudar a controlar la presión arterial.  · Grasas.  ¨ Alimentos que contengan grasas saludables, mu claire secos, aguacate, aceite de pope, aceite de canola y pescado.  ¿TODOS LOS QUE PADECEN DIABETES MELLITUS TIENEN EL MISMO PLAN DE COMIDAS?  Dado que todas las personas que padecen diabetes mellitus son distintas, no hay un solo plan de comidas que funcione para todos. Es muy importante que se reúna con un nutricionista que lo ayudará a crear un plan de comidas adecuado para usted.  Esta información no tiene mu fin reemplazar el consejo del médico. Asegúrese de hacerle al médico cualquier pregunta que tenga.  Document Released: 03/26/2009 Document Revised: 01/08/2016 Document Reviewed: 11/14/2014  Elsevier Interactive Patient Education © 2017 Elsevier Inc.

## 2020-06-12 NOTE — PROGRESS NOTES
New Patient to Establish    Reason to establish: New patient to establish    Arvind Jesus is a 44 y.o. male who presents today with the following:    CC:   Chief Complaint   Patient presents with   • Establish Care   • Diabetes   • Hypertension Follow-up   • Medication Refill     hydrochlorothiazide       HPI:     Class 1 obesity with body mass index (BMI) of 33.0 to 33.9 in adult  Body mass index is 33.28 kg/m².  Lifestyle modifications        Essential hypertension    DASH diet  HCTZ  Monitor BP  Denies any chest pain, shortness of breath, leg swelling, lightheadedness, dizziness.        Type 2 diabetes mellitus without complication, without long-term current use of insulin (HCC)  Presumed type 2, newly diagnosed, will check AYAH and insulin Ab  Patient does not want to start on insulin yet  He wants to try diabetic diet, exercise, medications first  Will initiate metformin, glipizide  Benefits, risks, and adverse reactions of medication discussed. Patient is agreeable with initiating the medication.    Monitor BG daily  Pending appointment with diabetic education and endocrinology   Ref. Range 5/29/2020 15:17   Glycohemoglobin Latest Ref Range: 0.0 - 5.6 % 10.2 (H)   Estim. Avg Glu Latest Units: mg/dL 246   Monofilament intact on 6/12/2020  Feet: tinea pedis, topical antibiotics        Tinea pedis of both feet  Scaly soles, cracks, toeweb maceration at toeweb between 4th and 5th toes.   antifungal topical          Current Outpatient Medications:   •  Blood Glucose Meter Kit, Test blood sugar as recommended by provider.Insurance preferred blood glucose monitoring kit., Disp: 1 Kit, Rfl: 0  •  Blood Glucose Test Strips, Use one Insurance preferred strip to test blood sugar once daily early morning before first meal., Disp: 100 Strip, Rfl: 2  •  Lancets, Use one Insurance preferred lancet to test blood sugar once daily early morning before first meal., Disp: 100 Each, Rfl: 2  •  Alcohol Swabs, Wipe site with  "prep pad prior to injection., Disp: 100 Each, Rfl: 2  •  metFORMIN (GLUCOPHAGE) 500 MG Tab, Take 1 Tab by mouth 2 times a day, with meals., Disp: 180 Tab, Rfl: 0  •  glipiZIDE (GLUCOTROL) 5 MG Tab, Take 1 Tab by mouth every day., Disp: 90 Tab, Rfl: 0  •  ketoconazole (NIZORAL) 2 % Cream, Apply 1 Application to affected area(s) every day., Disp: 1 Tube, Rfl: 1  •  hydroCHLOROthiazide (HYDRODIURIL) 25 MG Tab, Take 1 Tab by mouth every day., Disp: 30 Tab, Rfl: 0    Allergies, past medical history, past surgical history, medications, family history, social history reviewed and updated.    ROS     Constitutional: Denies fevers or chills  Eyes: Denies changes in vision  Ears/Nose/Throat/Mouth: Denies nasal congestion or sore throat   Cardiovascular: Denies chest pain or palpitations   Respiratory: Denies shortness of breath , Denies cough  Gastrointestinal/Hepatic: Denies abd pain, nausea, vomiting   Genitourinary: Denies dysuria or frequency  Musculoskeletal/Rheum: Denies joint pain and swelling   Neurological: Denies headache  Psychiatric: Denies mood disorder   Endocrine: hx of diabetes Denies  thyroid dysfunction  Heme/Oncology/Lymph Nodes: Denies weight changes or enlarged LNs.    Physical Exam  /80 (BP Location: Left arm, Patient Position: Sitting, BP Cuff Size: Adult)   Pulse 96   Temp 36.9 °C (98.5 °F) (Temporal)   Resp 16   Ht 1.791 m (5' 10.5\")   Wt 106.7 kg (235 lb 3.7 oz) Comment: wearing shoes  SpO2 97%   BMI 33.28 kg/m²   General: Normal appearance.  Well developed, well nourished, no acute distress.  HEENT: Normocephalic.  Extraocular motion intact. Pupils are equally round, reactive to light and accommodation, conjunctiva clear, no scleral icterus.  Ears: normal shape and contour, ear canals clear, tympanic membranes intact. Hearing intact.  Oropharynx clear, no erythema, edema or exudate noted.  NECK: Thyroid is not enlarged. No JVD.  No carotid bruits. No masses.  Cardiovascular: Regular " rhythm and rate. No murmur/rubs/gallops.   Respiratory: Normal respiratory effort, clear to auscultation bilaterally. No wheezing/rales/rhonchi.    Abdomen: Bowel sounds present, soft, nontender, nondistended, no rebound, no guarding. No hepatosplenomegaly.  : No suprapubic tenderness. No CVA tenderness.   EXT: no LE edema b/l. No cyanosis.  No clubbing.  Lymph: No cervical, supraclavicular or axillary lymph nodes are palpable  Skin: Warm and dry.  Scaly soles, cracks, toeweb maceration at toeweb between 4th and 5th toes.  Neurologic: No focal deficits.    Psych: AAOx3,  Normal mood and affect, normal judgment and insight, memory within normal limits    Labs (5/29/2020) were reviewed and discussed with patients.  All questions were answered.    Assessment and Plan    1. Type 2 diabetes mellitus without complication, without long-term current use of insulin ([facility])  - Blood Glucose Meter Kit; Test blood sugar as recommended by provider.Insurance preferred blood glucose monitoring kit.  Dispense: 1 Kit; Refill: 0  - Blood Glucose Test Strips; Use one Insurance preferred strip to test blood sugar once daily early morning before first meal.  Dispense: 100 Strip; Refill: 2  - Lancets; Use one Insurance preferred lancet to test blood sugar once daily early morning before first meal.  Dispense: 100 Each; Refill: 2  - Alcohol Swabs; Wipe site with prep pad prior to injection.  Dispense: 100 Each; Refill: 2  - metFORMIN (GLUCOPHAGE) 500 MG Tab; Take 1 Tab by mouth 2 times a day, with meals.  Dispense: 180 Tab; Refill: 0  - glipiZIDE (GLUCOTROL) 5 MG Tab; Take 1 Tab by mouth every day.  Dispense: 90 Tab; Refill: 0  - Lipid Profile; Future  - AYAH-65; Future  - INSULIN ANTIBODIES; Future  - MICROALBUMIN CREAT RATIO URINE; Future  - Diabetic Monofilament LE Exam  - REFERRAL TO OPHTHALMOLOGY    2. Class 1 obesity without serious comorbidity with body mass index (BMI) of 33.0 to 33.9 in adult, unspecified obesity type  Lifestyle  modifications    3. Essential hypertension  - hydroCHLOROthiazide (HYDRODIURIL) 25 MG Tab; Take 1 Tab by mouth every day.  Dispense: 30 Tab; Refill: 0  Monitor BP    4. Need for vaccination  - Pneumococal Polysaccharide Vaccine 23-Valent =>1YO SQ/IM  - Tdap =>8yo IM    5. Tinea pedis of both feet  - ketoconazole (NIZORAL) 2 % Cream; Apply 1 Application to affected area(s) every day.  Dispense: 1 Tube; Refill: 1    6. Elevated alkaline phosphatase level  - GAMMA GT (GGT); Future  - VITAMIN D,25 HYDROXY; Future    7. Screening for HIV (human immunodeficiency virus)  - HIV AG/AB COMBO ASSAY SCREENING; Future    8. Encounter for vitamin deficiency screening  - VITAMIN D,25 HYDROXY; Future      Follow-up:Return in about 1 month (around 7/12/2020), or if symptoms worsen or fail to improve.    This note was created using voice recognition software. There may be unintended errors in spelling, grammar or content.

## 2020-06-12 NOTE — ASSESSMENT & PLAN NOTE
Presumed type 2, newly diagnosed, will check AYAH and insulin Ab  Patient does not want to start on insulin yet  He wants to try diabetic diet, exercise, medications first  Will initiate metformin, glipizide  Benefits, risks, and adverse reactions of medication discussed. Patient is agreeable with initiating the medication.    Monitor BG daily  Pending appointment with diabetic education and endocrinology   Ref. Range 5/29/2020 15:17   Glycohemoglobin Latest Ref Range: 0.0 - 5.6 % 10.2 (H)   Estim. Avg Glu Latest Units: mg/dL 246   Monofilament intact on 6/12/2020  Feet: tinea pedis, topical antibiotics

## 2020-06-26 DIAGNOSIS — B35.3 TINEA PEDIS OF BOTH FEET: ICD-10-CM

## 2020-06-27 ENCOUNTER — HOSPITAL ENCOUNTER (OUTPATIENT)
Dept: LAB | Facility: MEDICAL CENTER | Age: 45
End: 2020-06-27
Attending: INTERNAL MEDICINE
Payer: COMMERCIAL

## 2020-06-27 DIAGNOSIS — E11.9 TYPE 2 DIABETES MELLITUS WITHOUT COMPLICATION, WITHOUT LONG-TERM CURRENT USE OF INSULIN (HCC): ICD-10-CM

## 2020-06-27 DIAGNOSIS — R74.8 ELEVATED ALKALINE PHOSPHATASE LEVEL: ICD-10-CM

## 2020-06-27 LAB
CHOLEST SERPL-MCNC: 134 MG/DL (ref 100–199)
CREAT UR-MCNC: 268.55 MG/DL
FASTING STATUS PATIENT QL REPORTED: NORMAL
GGT SERPL-CCNC: 26 U/L (ref 7–51)
HDLC SERPL-MCNC: 32 MG/DL
LDLC SERPL CALC-MCNC: 90 MG/DL
MICROALBUMIN UR-MCNC: 1.9 MG/DL
MICROALBUMIN/CREAT UR: 7 MG/G (ref 0–30)
TRIGL SERPL-MCNC: 58 MG/DL (ref 0–149)

## 2020-06-27 PROCEDURE — 36415 COLL VENOUS BLD VENIPUNCTURE: CPT

## 2020-06-27 PROCEDURE — 86337 INSULIN ANTIBODIES: CPT

## 2020-06-27 PROCEDURE — 82570 ASSAY OF URINE CREATININE: CPT

## 2020-06-27 PROCEDURE — 80061 LIPID PANEL: CPT

## 2020-06-27 PROCEDURE — 82043 UR ALBUMIN QUANTITATIVE: CPT

## 2020-06-27 PROCEDURE — 82977 ASSAY OF GGT: CPT

## 2020-06-27 PROCEDURE — 86341 ISLET CELL ANTIBODY: CPT

## 2020-06-29 RX ORDER — KETOCONAZOLE 20 MG/G
1 CREAM TOPICAL DAILY
Qty: 15 G | Refills: 1 | Status: SHIPPED | OUTPATIENT
Start: 2020-06-29 | End: 2020-08-04

## 2020-06-30 LAB — GAD65 AB SER IA-ACNC: <5 IU/ML (ref 0–5)

## 2020-07-02 LAB — INSULIN HUMAN AB SER-ACNC: <0.4 U/ML (ref 0–0.4)

## 2020-07-14 ENCOUNTER — OFFICE VISIT (OUTPATIENT)
Dept: MEDICAL GROUP | Facility: MEDICAL CENTER | Age: 45
End: 2020-07-14
Payer: COMMERCIAL

## 2020-07-14 VITALS
SYSTOLIC BLOOD PRESSURE: 134 MMHG | RESPIRATION RATE: 16 BRPM | DIASTOLIC BLOOD PRESSURE: 80 MMHG | HEART RATE: 68 BPM | BODY MASS INDEX: 32.65 KG/M2 | WEIGHT: 233.25 LBS | OXYGEN SATURATION: 97 % | HEIGHT: 71 IN | TEMPERATURE: 98.8 F

## 2020-07-14 DIAGNOSIS — B35.3 TINEA PEDIS OF BOTH FEET: ICD-10-CM

## 2020-07-14 DIAGNOSIS — E78.5 DYSLIPIDEMIA: ICD-10-CM

## 2020-07-14 DIAGNOSIS — E11.9 TYPE 2 DIABETES MELLITUS WITHOUT COMPLICATION, WITHOUT LONG-TERM CURRENT USE OF INSULIN (HCC): ICD-10-CM

## 2020-07-14 DIAGNOSIS — E66.9 CLASS 1 OBESITY WITHOUT SERIOUS COMORBIDITY WITH BODY MASS INDEX (BMI) OF 33.0 TO 33.9 IN ADULT, UNSPECIFIED OBESITY TYPE: ICD-10-CM

## 2020-07-14 DIAGNOSIS — I10 ESSENTIAL HYPERTENSION: ICD-10-CM

## 2020-07-14 PROCEDURE — 99214 OFFICE O/P EST MOD 30 MIN: CPT | Performed by: INTERNAL MEDICINE

## 2020-07-14 RX ORDER — LANCETS 30 GAUGE
EACH MISCELLANEOUS
Qty: 100 EACH | Refills: 2 | Status: SHIPPED | OUTPATIENT
Start: 2020-07-14 | End: 2021-07-12

## 2020-07-14 RX ORDER — BLOOD SUGAR DIAGNOSTIC
STRIP MISCELLANEOUS
COMMUNITY
Start: 2020-06-12 | End: 2022-01-18 | Stop reason: SDUPTHER

## 2020-07-14 RX ORDER — HYDROCHLOROTHIAZIDE 25 MG/1
TABLET ORAL
Qty: 30 TAB | Refills: 0 | Status: SHIPPED | OUTPATIENT
Start: 2020-07-14 | End: 2020-07-14 | Stop reason: SDUPTHER

## 2020-07-14 RX ORDER — ATORVASTATIN CALCIUM 10 MG/1
10 TABLET, FILM COATED ORAL DAILY
Qty: 30 TAB | Refills: 2 | Status: SHIPPED | OUTPATIENT
Start: 2020-07-14 | End: 2020-08-04 | Stop reason: SDUPTHER

## 2020-07-14 RX ORDER — BLOOD-GLUCOSE METER
EACH MISCELLANEOUS
COMMUNITY
Start: 2020-06-12

## 2020-07-14 RX ORDER — HYDROCHLOROTHIAZIDE 25 MG/1
25 TABLET ORAL
Qty: 30 TAB | Refills: 2 | Status: SHIPPED | OUTPATIENT
Start: 2020-07-14 | End: 2020-08-04

## 2020-07-14 ASSESSMENT — FIBROSIS 4 INDEX: FIB4 SCORE: .972972972972972973

## 2020-07-14 NOTE — ASSESSMENT & PLAN NOTE
The 10-year ASCVD risk score (Jason PRIETO Jr., et al., 2013) is: 4.4%    Values used to calculate the score:      Age: 44 years      Sex: Male      Is Non- : No      Diabetic: Yes      Tobacco smoker: No      Systolic Blood Pressure: 134 mmHg      Is BP treated: Yes      HDL Cholesterol: 32 mg/dL      Total Cholesterol: 134 mg/dL  Benefit and risks of statin discussed with patient include side effect such as muscle toxicity, patient was told if having side effects from medication, to stop the medication and notify healthcare provider.  Patient is agreeable with healthful diet, exercise and initiation of statin.

## 2020-07-14 NOTE — ASSESSMENT & PLAN NOTE
DASH diet  HCTZ  Monitor BP with upper arm cuff  Denies any chest pain, shortness of breath, leg swelling, lightheadedness, dizziness.  Monitor CMP

## 2020-07-14 NOTE — ASSESSMENT & PLAN NOTE
Food Poisoning or Viral Gastroenteritis (Adult)  You have a stomach illness that is likely either food poisoning or viral gastroenteritis.  Food poisoning is illness that is passed along in food and affects the stomach and intestinal tract. It usually occurs from 1 to 24 hours after eating food that has spoiled. When it happens within a few hours of eating, it is often caused by toxins from bacteria in food that has not been cooked or refrigerated properly.  Viral gastroenteritis is also an illness from a virus that affects the stomach and intestinal tract. Many people call it the “stomach flu,” but it has nothing to do with influenza. In fact, it can happen from food poisoning, but it can also happen when germs are passed from person-to-person or contaminated surface (toothbrush, cutting board, toilet) to a person.  Either illness can cause these symptoms:  · Abdominal pain and cramping  · Nausea  · Vomiting  · Diarrhea  · Fever and chills  · Loss of bowel control  The symptoms of food poisoning usually last 1 to 2 days. The symptoms of viral gastroenteritis can sometimes last up to 7 days but usually end sooner.  Antibiotics are not effective for either illness.  Other causes of gastroenteritis include bacteria and parasites which are not discussed here.  Home care  Follow all instructions given by your healthcare provider. Rest at home for the next 24 hours, or until you feel better. Avoid caffeine, tobacco, and alcohol. These can make diarrhea, cramping, and pain worse.  If taking medicines:  · Don’t take over-the-counter diarrhea or nausea medicines unless your healthcare provider tells you to.  · You may use acetaminophen or NSAID medicines like ibuprofen or naproxen to reduce pain and fever. Don’t use these if you have chronic liver or kidney disease, or ever had a stomach ulcer or GI bleeding. Talk with your healthcare provider first. Don't use NSAID medicines if you are already taking one for another  Type 2, newly diagnosed on 5/29/2020. AYAH and insulin Ab are both negative.  Patient does not want to start on insulin yet  He wants to try diabetic diet, exercise, medications first   metformin increase to 850 BID on 7/14/20, glipizide 5 mg BID    Monitor BG daily  Pending appointment with diabetic education and endocrinology   Ref. Range 5/29/2020 15:17   Glycohemoglobin Latest Ref Range: 0.0 - 5.6 % 10.2 (H)   Estim. Avg Glu Latest Units: mg/dL 246   Monofilament intact on 6/12/2020  Feet: tinea pedis, topical antifungal       condition (like arthritis) or are on daily aspirin therapy (such as for heart disease or after a stroke).  To prevent the spread of illness:  · Remember that washing with soap and water is the best way to prevent the spread of infection. Wash your hands before and after caring for a sick person.  · Clean the toilet after each use.  · Wash your hands or use alcohol-based hand  before eating.  · Wash your hands or use alcohol-based hand  before and after preparing food. Keep in mind that people with diarrhea or vomiting should not prepare food for others.  · Wash your hands or use alcohol-based hand  after using cutting boards, counter-tops, and knives (and other utensils) that have been in contact with raw foods.  · Wash and then peel fruits and vegetables.  · Keep uncooked meats away from cooked and ready-to-eat foods.  · Use a food thermometer when cooking. Cook poultry to at least 165°F (74°C). Cook ground meat (beef, veal, pork, lamb) to at least 160°F (71°C). Cook fresh beef, veal, lamb, and pork to at least 145°F (63°C).  · Don’t eat raw or undercooked eggs (poached or erik side up), poultry, meat or unpasteurized milk and juices.  Food and drinks  The main goal while treating vomiting or diarrhea is to prevent dehydration. This is done by taking small amounts of liquids often.  · Keep in mind that liquids are more important than food right now.  · Drink only small amounts of liquids at a time.  · Don’t force yourself to eat, especially if you are having cramping, vomiting, or diarrhea. Don’t eat large amounts at a time, even if you are hungry.  · If you eat, avoid fatty, greasy, spicy, or fried foods.  · Don’t eat dairy foods or drink milk if you have diarrhea. These can make diarrhea worse.  The first 24 hours you can try:  · Oral rehydration solutions, available at grocery stores and pharmacies. Sports drinks are not a good choice if you are very dehydrated. They have too much  sugar and not enough electrolytes.  · Soft drinks without caffeine  · Ginger ale  · Water (plain or flavored)  · Decaf tea or coffee  · Clear broth, consommé, or bouillon  · Gelatin, popsicles, or frozen fruit juice bars   The second 24 hours, if you are feeling better, you can add:  · Hot cereal, plain toast, bread, rolls, or crackers  · Plain noodles, rice, mashed potatoes, chicken noodle soup, or rice soup  · Unsweetened canned fruit (no pineapple)  · Bananas  As you recover:  · Limit fat intake to less than 15 grams per day. Don’t eat margarine, butter, oils, mayonnaise, sauces, gravies, fried foods, peanut butter, meat, poultry, or fish.  · Limit fiber. Don’t eat raw or cooked vegetables, fresh fruits except bananas, and bran cereals.  · Limit caffeine and chocolate.  · Don’t use spices or seasonings except salt.  · Resume a normal diet over time, as you feel better and your symptoms improve.  · If the symptoms come back, go back to a simple diet or clear liquids.  Follow-up care  Follow up with your healthcare provider, or as advised. If a stool sample was taken or cultures were done, call the healthcare provider for the results as instructed.  Call 911  Call 911 if you have any of these symptoms:  · Trouble breathing  · Confusion  · Extreme drowsiness or trouble walking  · Loss of consciousness  · Rapid heart rate  · Chest pain  · Stiff neck  · Seizure  When to seek medical advice  Call your healthcare provider right away if any of these occur:  · Abdominal pain that gets worse  · Constant lower right abdominal pain  · Continued vomiting and inability to keep liquids down  · Diarrhea more than 5 times a day  · Blood in vomit or stool  · Dark urine or no urine for 8 hours, dry mouth and tongue, tiredness, weakness, or dizziness  · New rash  · You don’t get better in 2 to 3 days  · Fever of 100.4°F (38°C) or higher that doesn’t get lower with medicine  · You have new symptoms of arthritis  © 1060-3952 [employer]. [address] [place] [address], [place] [address]. All rights reserved. This information is not intended as a substitute for professional medical care. Always follow your healthcare professional's instructions.

## 2020-07-14 NOTE — PROGRESS NOTES
Established Patient    Arvind Jesus is a 44 y.o. male who presents today with the following:    CC:   Chief Complaint   Patient presents with   • Follow-Up     Labs and Blood Pressure       HPI:     Class 1 obesity with body mass index (BMI) of 33.0 to 33.9 in adult  Body mass index is 32.98 kg/m².  Lifestyle modifications        Type 2 diabetes mellitus without complication, without long-term current use of insulin (HCC)  Type 2, newly diagnosed on 5/29/2020. AYAH and insulin Ab are both negative.  Patient does not want to start on insulin yet  He wants to try diabetic diet, exercise, medications first   metformin increase to 850 BID on 7/14/20, glipizide 5 mg BID    Monitor BG daily  Pending appointment with diabetic education and endocrinology   Ref. Range 5/29/2020 15:17   Glycohemoglobin Latest Ref Range: 0.0 - 5.6 % 10.2 (H)   Estim. Avg Glu Latest Units: mg/dL 246   Monofilament intact on 6/12/2020  Feet: tinea pedis, topical antifungal        Tinea pedis of both feet  Scaly soles, cracks, toeweb maceration at toeweb between 4th and 5th toes.  Improving with ketoconazole topical        Essential hypertension    DASH diet  HCTZ  Monitor BP with upper arm cuff  Denies any chest pain, shortness of breath, leg swelling, lightheadedness, dizziness.  Monitor CMP      Dyslipidemia  The 10-year ASCVD risk score (Jason IDA Jr., et al., 2013) is: 4.4%    Values used to calculate the score:      Age: 44 years      Sex: Male      Is Non- : No      Diabetic: Yes      Tobacco smoker: No      Systolic Blood Pressure: 134 mmHg      Is BP treated: Yes      HDL Cholesterol: 32 mg/dL      Total Cholesterol: 134 mg/dL  Benefit and risks of statin discussed with patient include side effect such as muscle toxicity, patient was told if having side effects from medication, to stop the medication and notify healthcare provider.  Patient is agreeable with healthful diet, exercise and initiation of  statin.          Current Outpatient Medications   Medication Sig Dispense Refill   • metFORMIN (GLUCOPHAGE) 850 MG Tab Take 1 Tab by mouth 2 times a day, with meals. 60 Tab 0   • hydroCHLOROthiazide (HYDRODIURIL) 25 MG Tab Take 1 Tab by mouth every day. 30 Tab 2   • Blood Glucose Test Strips Use one One Touch Verio strip to test blood sugar once daily early morning before first meal. 100 Strip 2   • Lancets Use one Insurance preferred lancet to test blood sugar once daily early morning before first meal. 100 Each 2   • atorvastatin (LIPITOR) 10 MG Tab Take 1 Tab by mouth every day. 30 Tab 2   • ketoconazole (NIZORAL) 2 % Cream APPLY 1 APPLICATION TO AFFECTED AREA(S) EVERY DAY. 15 g 1   • Blood Glucose Meter Kit Test blood sugar as recommended by provider.Insurance preferred blood glucose monitoring kit. 1 Kit 0   • Alcohol Swabs Wipe site with prep pad prior to injection. 100 Each 2   • glipiZIDE (GLUCOTROL) 5 MG Tab Take 1 Tab by mouth every day. 90 Tab 0   • ONETOUCH VERIO strip USE TO TEST BLOOD SUGAR ONCE A DAY IN EARLY MORNING BEFORE FIRST MEAL     • Blood Glucose Monitoring Suppl (ONETOUCH VERIO) w/Device Kit USE TO TEST BLOOD SUGAR ONCE A DAY IN EARLY MORNING BEFORE FIRST MEAL       No current facility-administered medications for this visit.        Allergies, past medical history, past surgical history, medications, family history, social history reviewed and updated.    ROS   Constitutional: Denies fevers or chills  Eyes: Denies changes in vision  Ears/Nose/Throat/Mouth: Denies nasal congestion or sore throat   Cardiovascular: Denies chest pain or palpitations   Respiratory: Denies shortness of breath , Denies cough  Gastrointestinal/Hepatic: Denies abd pain, nausea, vomiting   Genitourinary: Denies dysuria or frequency  Musculoskeletal/Rheum: Denies joint pain and swelling   Neurological: Denies headache  Psychiatric: Denies mood disorder   Endocrine:  hx of diabetes Denies thyroid  "dysfunction  Heme/Oncology/Lymph Nodes: Denies weight changes or enlarged LNs.    Physical Exam  Vitals: /80 (BP Location: Left arm, Patient Position: Sitting, BP Cuff Size: Adult)   Pulse 68   Temp 37.1 °C (98.8 °F) (Temporal)   Resp 16   Ht 1.791 m (5' 10.51\")   Wt 105.8 kg (233 lb 4 oz) Comment: wearing shoes  SpO2 97%   BMI 32.98 kg/m²   General: Alert, pleasant, NAD  HEENT: Normocephalic.  EOMI, no icterus or pallor.  Conjunctivae and lids normal. External ears normal. Oropharynx non-erythematous, mucous membranes moist.  Neck supple.  No thyromegaly or masses palpated.   Lymph: No cervical or supraclavicular lymphadenopathy.  Cardiovascular: Regular rate and rhythm.  S1 and S2 normal.  No murmurs appreciated.  Respiratory: Normal respiratory effort.  Clear to auscultation bilaterally.  Abdomen: Non-distended, soft, non-tender  Skin: Warm, dry, Scaly soles, cracks  Musculoskeletal: Gait is normal.  Moves all extremities well.  Extremities: No leg edema.  Radial pulses 2+ symmetric.   Psych:  Affect/mood is normal, judgement is good, memory is intact, grooming is appropriate.      Labs (6/27/2020) were reviewed and discussed with patients.  All questions were answered.      Assessment and Plan    1. Type 2 diabetes mellitus without complication, without long-term current use of insulin (HCC)  - metFORMIN (GLUCOPHAGE) 850 MG Tab; Take 1 Tab by mouth 2 times a day, with meals.  Dispense: 60 Tab; Refill: 0  - Blood Glucose Test Strips; Use one One Touch Verio strip to test blood sugar once daily early morning before first meal.  Dispense: 100 Strip; Refill: 2  - Lancets; Use one Insurance preferred lancet to test blood sugar once daily early morning before first meal.  Dispense: 100 Each; Refill: 2  - atorvastatin (LIPITOR) 10 MG Tab; Take 1 Tab by mouth every day.  Dispense: 30 Tab; Refill: 2    2. Essential hypertension  - hydroCHLOROthiazide (HYDRODIURIL) 25 MG Tab; Take 1 Tab by mouth every day.  " Dispense: 30 Tab; Refill: 2  - Comp Metabolic Panel; Future    3. Dyslipidemia  - atorvastatin (LIPITOR) 10 MG Tab; Take 1 Tab by mouth every day.  Dispense: 30 Tab; Refill: 2  Benefit and risks of statin discussed with patient include side effect such as muscle toxicity, patient was told if having side effects from medication, to stop the medication and notify healthcare provider.  Patient is agreeable with healthful diet, exercise and initiation of statin.    4. Class 1 obesity without serious comorbidity with body mass index (BMI) of 33.0 to 33.9 in adult, unspecified obesity type  Lifestyle modifications    5. Tinea pedis of both feet  Continue ketoconazole cream      Follow-up:Return in about 3 weeks (around 8/4/2020), or if symptoms worsen or fail to improve.    This note was created using voice recognition software. There may be unintended errors in spelling, grammar or content.

## 2020-07-14 NOTE — TELEPHONE ENCOUNTER
Received request via: Pharmacy    Was the patient seen in the last year in this department? Yes    Does the patient have an active prescription (recently filled or refills available) for medication(s) requested? No. Ran out on 7/12

## 2020-07-14 NOTE — ASSESSMENT & PLAN NOTE
Scaly soles, cracks, toeweb maceration at toeweb between 4th and 5th toes.  Improving with ketoconazole topical

## 2020-08-03 ENCOUNTER — HOSPITAL ENCOUNTER (OUTPATIENT)
Dept: LAB | Facility: MEDICAL CENTER | Age: 45
End: 2020-08-03
Attending: INTERNAL MEDICINE
Payer: COMMERCIAL

## 2020-08-03 DIAGNOSIS — R74.8 ELEVATED ALKALINE PHOSPHATASE LEVEL: ICD-10-CM

## 2020-08-03 DIAGNOSIS — I10 ESSENTIAL HYPERTENSION: ICD-10-CM

## 2020-08-03 DIAGNOSIS — Z11.4 SCREENING FOR HIV (HUMAN IMMUNODEFICIENCY VIRUS): ICD-10-CM

## 2020-08-03 DIAGNOSIS — Z13.21 ENCOUNTER FOR VITAMIN DEFICIENCY SCREENING: ICD-10-CM

## 2020-08-03 LAB
25(OH)D3 SERPL-MCNC: 19 NG/ML (ref 30–100)
ALBUMIN SERPL BCP-MCNC: 4.4 G/DL (ref 3.2–4.9)
ALBUMIN/GLOB SERPL: 1.7 G/DL
ALP SERPL-CCNC: 100 U/L (ref 30–99)
ALT SERPL-CCNC: 34 U/L (ref 2–50)
ANION GAP SERPL CALC-SCNC: 12 MMOL/L (ref 7–16)
AST SERPL-CCNC: 21 U/L (ref 12–45)
BILIRUB SERPL-MCNC: 0.7 MG/DL (ref 0.1–1.5)
BUN SERPL-MCNC: 12 MG/DL (ref 8–22)
CALCIUM SERPL-MCNC: 8.8 MG/DL (ref 8.5–10.5)
CHLORIDE SERPL-SCNC: 101 MMOL/L (ref 96–112)
CO2 SERPL-SCNC: 27 MMOL/L (ref 20–33)
CREAT SERPL-MCNC: 0.7 MG/DL (ref 0.5–1.4)
GLOBULIN SER CALC-MCNC: 2.6 G/DL (ref 1.9–3.5)
GLUCOSE SERPL-MCNC: 110 MG/DL (ref 65–99)
HIV 1+2 AB+HIV1 P24 AG SERPL QL IA: NORMAL
POTASSIUM SERPL-SCNC: 3.5 MMOL/L (ref 3.6–5.5)
PROT SERPL-MCNC: 7 G/DL (ref 6–8.2)
SODIUM SERPL-SCNC: 140 MMOL/L (ref 135–145)

## 2020-08-03 PROCEDURE — 82306 VITAMIN D 25 HYDROXY: CPT

## 2020-08-03 PROCEDURE — 36415 COLL VENOUS BLD VENIPUNCTURE: CPT

## 2020-08-03 PROCEDURE — 87389 HIV-1 AG W/HIV-1&-2 AB AG IA: CPT

## 2020-08-03 PROCEDURE — 80053 COMPREHEN METABOLIC PANEL: CPT

## 2020-08-04 ENCOUNTER — OFFICE VISIT (OUTPATIENT)
Dept: MEDICAL GROUP | Facility: MEDICAL CENTER | Age: 45
End: 2020-08-04
Payer: COMMERCIAL

## 2020-08-04 VITALS
RESPIRATION RATE: 16 BRPM | HEIGHT: 71 IN | DIASTOLIC BLOOD PRESSURE: 78 MMHG | TEMPERATURE: 97.6 F | BODY MASS INDEX: 33.8 KG/M2 | OXYGEN SATURATION: 95 % | HEART RATE: 77 BPM | WEIGHT: 241.4 LBS | SYSTOLIC BLOOD PRESSURE: 128 MMHG

## 2020-08-04 DIAGNOSIS — I10 ESSENTIAL HYPERTENSION: ICD-10-CM

## 2020-08-04 DIAGNOSIS — B35.3 TINEA PEDIS OF BOTH FEET: ICD-10-CM

## 2020-08-04 DIAGNOSIS — E78.5 DYSLIPIDEMIA: ICD-10-CM

## 2020-08-04 DIAGNOSIS — E87.6 HYPOKALEMIA: ICD-10-CM

## 2020-08-04 DIAGNOSIS — E55.9 VITAMIN D DEFICIENCY: ICD-10-CM

## 2020-08-04 DIAGNOSIS — E11.9 TYPE 2 DIABETES MELLITUS WITHOUT COMPLICATION, WITHOUT LONG-TERM CURRENT USE OF INSULIN (HCC): ICD-10-CM

## 2020-08-04 DIAGNOSIS — E66.9 CLASS 1 OBESITY WITHOUT SERIOUS COMORBIDITY WITH BODY MASS INDEX (BMI) OF 33.0 TO 33.9 IN ADULT, UNSPECIFIED OBESITY TYPE: ICD-10-CM

## 2020-08-04 PROCEDURE — 99214 OFFICE O/P EST MOD 30 MIN: CPT | Performed by: INTERNAL MEDICINE

## 2020-08-04 RX ORDER — AMLODIPINE BESYLATE 5 MG/1
5 TABLET ORAL DAILY
Qty: 30 TAB | Refills: 0 | Status: SHIPPED | OUTPATIENT
Start: 2020-08-04 | End: 2020-08-27

## 2020-08-04 RX ORDER — ERGOCALCIFEROL 1.25 MG/1
50000 CAPSULE ORAL
Qty: 12 CAP | Refills: 0 | Status: SHIPPED | OUTPATIENT
Start: 2020-08-04 | End: 2021-01-14

## 2020-08-04 RX ORDER — KETOCONAZOLE 20 MG/G
1 CREAM TOPICAL DAILY
Qty: 3 G | Refills: 2 | Status: SHIPPED | OUTPATIENT
Start: 2020-08-04 | End: 2020-09-01 | Stop reason: SDUPTHER

## 2020-08-04 RX ORDER — POTASSIUM CHLORIDE 20 MEQ/1
20 TABLET, EXTENDED RELEASE ORAL 2 TIMES DAILY
Qty: 2 TAB | Refills: 0 | Status: SHIPPED | OUTPATIENT
Start: 2020-08-04 | End: 2020-08-05

## 2020-08-04 RX ORDER — ATORVASTATIN CALCIUM 10 MG/1
10 TABLET, FILM COATED ORAL DAILY
Qty: 90 TAB | Refills: 1 | Status: SHIPPED | OUTPATIENT
Start: 2020-08-04 | End: 2020-09-01 | Stop reason: SDUPTHER

## 2020-08-04 ASSESSMENT — FIBROSIS 4 INDEX: FIB4 SCORE: 0.88

## 2020-08-04 NOTE — ASSESSMENT & PLAN NOTE
The 10-year ASCVD risk score (Jason PRIETO Jr., et al., 2013) is: 3.4%    Values used to calculate the score:      Age: 44 years      Sex: Male      Is Non- : No      Diabetic: Yes      Tobacco smoker: No      Systolic Blood Pressure: 128 mmHg      Is BP treated: No      HDL Cholesterol: 32 mg/dL      Total Cholesterol: 134 mg/dL  On statin, tolerating well

## 2020-08-04 NOTE — ASSESSMENT & PLAN NOTE
Ref. Range 8/3/2020 09:38   Sodium Latest Ref Range: 135 - 145 mmol/L 140   Potassium Latest Ref Range: 3.6 - 5.5 mmol/L 3.5 (L)   Chloride Latest Ref Range: 96 - 112 mmol/L 101   Co2 Latest Ref Range: 20 - 33 mmol/L 27   Anion Gap Latest Ref Range: 7.0 - 16.0  12.0   Glucose Latest Ref Range: 65 - 99 mg/dL 110 (H)   Bun Latest Ref Range: 8 - 22 mg/dL 12   Creatinine Latest Ref Range: 0.50 - 1.40 mg/dL 0.70   GFR If  Latest Ref Range: >60 mL/min/1.73 m 2 >60   GFR If Non  Latest Ref Range: >60 mL/min/1.73 m 2 >60   Calcium Latest Ref Range: 8.5 - 10.5 mg/dL 8.8   AST(SGOT) Latest Ref Range: 12 - 45 U/L 21   ALT(SGPT) Latest Ref Range: 2 - 50 U/L 34   Alkaline Phosphatase Latest Ref Range: 30 - 99 U/L 100 (H)   Total Bilirubin Latest Ref Range: 0.1 - 1.5 mg/dL 0.7   Albumin Latest Ref Range: 3.2 - 4.9 g/dL 4.4   Total Protein Latest Ref Range: 6.0 - 8.2 g/dL 7.0   Globulin Latest Ref Range: 1.9 - 3.5 g/dL 2.6   A-G Ratio Latest Units: g/dL 1.7   Replacement and monitor

## 2020-08-04 NOTE — PROGRESS NOTES
Established Patient    Arvind Jesus is a 44 y.o. male who presents today with the following:    CC:   Chief Complaint   Patient presents with   • Follow-Up     Labs, High Blood Pressure       HPI:     Type 2 diabetes mellitus without complication, without long-term current use of insulin (HCC)  Type 2, newly diagnosed on 5/29/2020. AYAH and insulin Ab are both negative.  Patient does not want to start on insulin yet  He wants to try diabetic diet, exercise, medications first   metformin increase to 850 BID on 7/14/20, glipizide 5 mg daily    Monitor BG daily  Pending appointment with diabetic education and endocrinology   Ref. Range 5/29/2020 15:17   Glycohemoglobin Latest Ref Range: 0.0 - 5.6 % 10.2 (H)   Estim. Avg Glu Latest Units: mg/dL 246   Monofilament intact on 6/12/2020  Feet: tinea pedis, topical antifungal        Class 1 obesity with body mass index (BMI) of 33.0 to 33.9 in adult  Body mass index is 34.14 kg/m².  Lifestyle modifications        Essential hypertension  DASH diet  Will discontinue HCTZ and switch to amlodipine  Monitor BP with upper arm cuff  Denies any chest pain, shortness of breath, leg swelling, lightheadedness, dizziness.  Monitor BMP      Tinea pedis of both feet  Scaly soles, cracks, toeweb maceration at toeweb between 4th and 5th toes.  Improving with ketoconazole topical        Dyslipidemia  The 10-year ASCVD risk score (Simonmeggan PRIETO Jr., et al., 2013) is: 3.4%    Values used to calculate the score:      Age: 44 years      Sex: Male      Is Non- : No      Diabetic: Yes      Tobacco smoker: No      Systolic Blood Pressure: 128 mmHg      Is BP treated: No      HDL Cholesterol: 32 mg/dL      Total Cholesterol: 134 mg/dL  On statin, tolerating well        Hypokalemia     Ref. Range 8/3/2020 09:38   Sodium Latest Ref Range: 135 - 145 mmol/L 140   Potassium Latest Ref Range: 3.6 - 5.5 mmol/L 3.5 (L)   Chloride Latest Ref Range: 96 - 112 mmol/L 101   Co2 Latest  Ref Range: 20 - 33 mmol/L 27   Anion Gap Latest Ref Range: 7.0 - 16.0  12.0   Glucose Latest Ref Range: 65 - 99 mg/dL 110 (H)   Bun Latest Ref Range: 8 - 22 mg/dL 12   Creatinine Latest Ref Range: 0.50 - 1.40 mg/dL 0.70   GFR If  Latest Ref Range: >60 mL/min/1.73 m 2 >60   GFR If Non  Latest Ref Range: >60 mL/min/1.73 m 2 >60   Calcium Latest Ref Range: 8.5 - 10.5 mg/dL 8.8   AST(SGOT) Latest Ref Range: 12 - 45 U/L 21   ALT(SGPT) Latest Ref Range: 2 - 50 U/L 34   Alkaline Phosphatase Latest Ref Range: 30 - 99 U/L 100 (H)   Total Bilirubin Latest Ref Range: 0.1 - 1.5 mg/dL 0.7   Albumin Latest Ref Range: 3.2 - 4.9 g/dL 4.4   Total Protein Latest Ref Range: 6.0 - 8.2 g/dL 7.0   Globulin Latest Ref Range: 1.9 - 3.5 g/dL 2.6   A-G Ratio Latest Units: g/dL 1.7   Replacement and monitor        Current Outpatient Medications   Medication Sig Dispense Refill   • amLODIPine (NORVASC) 5 MG Tab Take 1 Tab by mouth every day. 30 Tab 0   • potassium chloride SA (KDUR) 20 MEQ Tab CR Take 1 Tab by mouth 2 times a day for 1 day. 2 Tab 0   • metFORMIN (GLUCOPHAGE) 850 MG Tab Take 1 Tab by mouth 2 times a day, with meals. 180 Tab 1   • atorvastatin (LIPITOR) 10 MG Tab Take 1 Tab by mouth every day. 90 Tab 1   • vitamin D, Ergocalciferol, (DRISDOL) 1.25 MG (22623 UT) Cap capsule Take 1 Cap by mouth every 7 days. 12 Cap 0   • ketoconazole (NIZORAL) 2 % Cream Apply 1 Application to affected area(s) every day. 3 g 2   • Blood Glucose Test Strips Use one One Touch Verio strip to test blood sugar once daily early morning before first meal. 100 Strip 2   • Lancets Use one Insurance preferred lancet to test blood sugar once daily early morning before first meal. 100 Each 2   • ONETOUCH VERIO strip USE TO TEST BLOOD SUGAR ONCE A DAY IN EARLY MORNING BEFORE FIRST MEAL     • Blood Glucose Monitoring Suppl (ONETOUCH VERIO) w/Device Kit USE TO TEST BLOOD SUGAR ONCE A DAY IN EARLY MORNING BEFORE FIRST MEAL     •  "Blood Glucose Meter Kit Test blood sugar as recommended by provider.Insurance preferred blood glucose monitoring kit. 1 Kit 0   • Alcohol Swabs Wipe site with prep pad prior to injection. 100 Each 2   • glipiZIDE (GLUCOTROL) 5 MG Tab Take 1 Tab by mouth every day. 90 Tab 0     No current facility-administered medications for this visit.        Allergies, past medical history, past surgical history, medications, family history, social history reviewed and updated.    ROS   Constitutional: Denies fevers or chills  Eyes: Denies changes in vision  Ears/Nose/Throat/Mouth: Denies nasal congestion or sore throat   Cardiovascular: Denies chest pain or palpitations   Respiratory: Denies shortness of breath , Denies cough  Gastrointestinal/Hepatic: Denies abd pain, nausea, vomiting   Genitourinary: Denies dysuria or frequency  Musculoskeletal/Rheum: Denies joint pain and swelling   Neurological: Denies headache  Psychiatric: Denies mood disorder   Endocrine: hx of diabetes Denies thyroid dysfunction  Heme/Oncology/Lymph Nodes: Denies weight changes or enlarged LNs.    Physical Exam  Vitals: /78 (BP Location: Left arm, Patient Position: Sitting, BP Cuff Size: Adult)   Pulse 77   Temp 36.4 °C (97.6 °F) (Temporal)   Resp 16   Ht 1.791 m (5' 10.51\")   Wt 109.5 kg (241 lb 6.5 oz) Comment: wearing shoes  SpO2 95%   BMI 34.14 kg/m²   General: Alert, pleasant, NAD  HEENT: Normocephalic.  EOMI, no icterus or pallor.  Conjunctivae and lids normal. External ears normal. Oropharynx non-erythematous, mucous membranes moist.  Neck supple.  No thyromegaly or masses palpated.   Lymph: No cervical or supraclavicular lymphadenopathy.  Cardiovascular: Regular rate and rhythm.   Respiratory: Normal respiratory effort.  Clear to auscultation bilaterally.  Abdomen: Non-distended, soft, non-tender  Skin: Warm, dry, no rashes.  Musculoskeletal: Gait is normal.  Moves all extremities well.  Extremities: No leg edema.    Psych:  " Affect/mood is normal, judgement is good, memory is intact, grooming is appropriate.    Assessment and Plan    1. Essential hypertension  - amLODIPine (NORVASC) 5 MG Tab; Take 1 Tab by mouth every day.  Dispense: 30 Tab; Refill: 0  - Basic Metabolic Panel; Future  DASH diet  Monitor BP    2. Type 2 diabetes mellitus without complication, without long-term current use of insulin (HCC)  - metFORMIN (GLUCOPHAGE) 850 MG Tab; Take 1 Tab by mouth 2 times a day, with meals.  Dispense: 180 Tab; Refill: 1  - atorvastatin (LIPITOR) 10 MG Tab; Take 1 Tab by mouth every day.  Dispense: 90 Tab; Refill: 1  - HEMOGLOBIN A1C; Future    3. Dyslipidemia  - atorvastatin (LIPITOR) 10 MG Tab; Take 1 Tab by mouth every day.  Dispense: 90 Tab; Refill: 1    4. Hypokalemia  - potassium chloride SA (KDUR) 20 MEQ Tab CR; Take 1 Tab by mouth 2 times a day for 1 day.  Dispense: 2 Tab; Refill: 0  - Basic Metabolic Panel; Future  - MAGNESIUM; Future    5. Vitamin D deficiency  - vitamin D, Ergocalciferol, (DRISDOL) 1.25 MG (30003 UT) Cap capsule; Take 1 Cap by mouth every 7 days.  Dispense: 12 Cap; Refill: 0    6. Tinea pedis of both feet  - ketoconazole (NIZORAL) 2 % Cream; Apply 1 Application to affected area(s) every day.  Dispense: 3 g; Refill: 2    7. Class 1 obesity without serious comorbidity with body mass index (BMI) of 33.0 to 33.9 in adult, unspecified obesity type  Lifestyle modifications        Follow-up:Return in about 1 month (around 9/4/2020), or if symptoms worsen or fail to improve.    This note was created using voice recognition software. There may be unintended errors in spelling, grammar or content.

## 2020-08-04 NOTE — ASSESSMENT & PLAN NOTE
Type 2, newly diagnosed on 5/29/2020. AYAH and insulin Ab are both negative.  Patient does not want to start on insulin yet  He wants to try diabetic diet, exercise, medications first   metformin increase to 850 BID on 7/14/20, glipizide 5 mg daily    Monitor BG daily  Pending appointment with diabetic education and endocrinology   Ref. Range 5/29/2020 15:17   Glycohemoglobin Latest Ref Range: 0.0 - 5.6 % 10.2 (H)   Estim. Avg Glu Latest Units: mg/dL 246   Monofilament intact on 6/12/2020  Feet: tinea pedis, topical antifungal

## 2020-08-04 NOTE — ASSESSMENT & PLAN NOTE
DASH diet  Will discontinue HCTZ and switch to amlodipine  Monitor BP with upper arm cuff  Denies any chest pain, shortness of breath, leg swelling, lightheadedness, dizziness.  Monitor BMP

## 2020-08-10 ENCOUNTER — OFFICE VISIT (OUTPATIENT)
Dept: ENDOCRINOLOGY | Facility: MEDICAL CENTER | Age: 45
End: 2020-08-10
Attending: INTERNAL MEDICINE
Payer: COMMERCIAL

## 2020-08-10 VITALS
HEART RATE: 71 BPM | OXYGEN SATURATION: 96 % | WEIGHT: 240.1 LBS | DIASTOLIC BLOOD PRESSURE: 90 MMHG | SYSTOLIC BLOOD PRESSURE: 140 MMHG | BODY MASS INDEX: 34.37 KG/M2 | HEIGHT: 70 IN

## 2020-08-10 DIAGNOSIS — Z79.84 LONG TERM (CURRENT) USE OF ORAL HYPOGLYCEMIC DRUGS: ICD-10-CM

## 2020-08-10 DIAGNOSIS — E78.5 DYSLIPIDEMIA: ICD-10-CM

## 2020-08-10 DIAGNOSIS — E11.9 TYPE 2 DIABETES MELLITUS WITHOUT COMPLICATION, WITHOUT LONG-TERM CURRENT USE OF INSULIN (HCC): ICD-10-CM

## 2020-08-10 LAB
HBA1C MFR BLD: 6.4 % (ref 0–5.6)
INT CON NEG: NEGATIVE
INT CON POS: POSITIVE

## 2020-08-10 PROCEDURE — 83036 HEMOGLOBIN GLYCOSYLATED A1C: CPT | Performed by: INTERNAL MEDICINE

## 2020-08-10 PROCEDURE — 99204 OFFICE O/P NEW MOD 45 MIN: CPT | Performed by: INTERNAL MEDICINE

## 2020-08-10 PROCEDURE — 99212 OFFICE O/P EST SF 10 MIN: CPT | Performed by: INTERNAL MEDICINE

## 2020-08-10 RX ORDER — SEMAGLUTIDE 1.34 MG/ML
0.5 INJECTION, SOLUTION SUBCUTANEOUS
Qty: 1 PEN | Refills: 11 | Status: SHIPPED | OUTPATIENT
Start: 2020-08-10 | End: 2020-09-01

## 2020-08-10 ASSESSMENT — FIBROSIS 4 INDEX: FIB4 SCORE: 0.9

## 2020-08-10 NOTE — PROGRESS NOTES
"Chief Complaint:  Consult requested by Jayy Franco M.D. for initial evaluation of Type 2 Diabetes Mellitus    HPI:   Arvind Jesus is a 45 y.o. male with Type 2 Diabetes Mellitus diagnosed in 2020.  He denies hospitalizations for DKA in the past.    His baseline a1c was 10% on May 2020, his a1c has improved to 6.4% after being started on Metformin 1000mg bid and Glipizide 5mg daily.  He reports feeling better after bring placed on meds.    He is  and needs a , he was accompanied by a family friend.       He monitors blood glucose  2 times per day. Blood glucose values range:Breakfast: 100, 120, 130           He denies hypoglycemic episodes occurring    He  denies hypoglycemic unawareness. He denies episodes of severe hypoglycemia requiring third party assistance.  He  is not wearing a medical alert bracelet or necklace.  He does not a glucagon emergency kit.    He denies attending diabetes education classes.  Diet: \"unhealthy\" diet in general.    Diabetes Complications   He  denies history of retinopathy.  He denies laser eye surgery. Last eye exam: He is overdue for an eye exam  He denies history of peripheral sensory neuropathy.  He denies numbness, tingling in both feet.  He denies history of foot sores.   He denies history of kidney damage.  He is not on ACE inhibitor or ARB.   He denies history of coronary artery disease.  He  denies history of stroke and denies TIA.  He denies history of PAD.  He denies history of hyperlipidemia. He is on Atorvastatin.  His last LDL-C was 90 on June 2020      ROS:     CONS:     No fever, no chills, no weight loss, no fatigue   EYES:      No diplopia, no blurry vision, no redness of eyes, no swelling of eyelids   ENT:    No hearing loss, No ear pain, No sore throat, no dysphagia, no neck swelling   CV:     No chest pain, no palpitations, no claudication, no orthopnea, no PND   PULM:    No SOB, no cough, no hemoptysis, no wheezing    GI:   " No nausea, no vomiting, no diarrhea, no constipation, no bloody stools   :  Passing urine well, no dysuria, no hematuria   ENDO:   No polyuria, no polydipsia, no heat intolerance, no cold intolerance   NEURO: No headaches, no dizziness, no convulsions, no tremors   MUSC:  No joint swellings, no arthralgias, no myalgias, no weakness   SKIN:   No rash, no ulcers, no dry skin   PSYCH:   No depression, no anxiety, no difficulty sleeping       Past Medical History:  Patient Active Problem List    Diagnosis Date Noted   • Vitamin D deficiency 08/04/2020   • Hypokalemia 08/04/2020   • Dyslipidemia 07/14/2020   • Type 2 diabetes mellitus without complication, without long-term current use of insulin (HCC) 06/12/2020   • Class 1 obesity with body mass index (BMI) of 33.0 to 33.9 in adult 06/12/2020   • Essential hypertension 06/12/2020   • Tinea pedis of both feet 06/12/2020       Past Surgical History:  History reviewed. No pertinent surgical history.     Allergies:  Patient has no known allergies.     Current Medications:    Current Outpatient Medications:   •  amLODIPine (NORVASC) 5 MG Tab, Take 1 Tab by mouth every day., Disp: 30 Tab, Rfl: 0  •  metFORMIN (GLUCOPHAGE) 850 MG Tab, Take 1 Tab by mouth 2 times a day, with meals., Disp: 180 Tab, Rfl: 1  •  atorvastatin (LIPITOR) 10 MG Tab, Take 1 Tab by mouth every day., Disp: 90 Tab, Rfl: 1  •  vitamin D, Ergocalciferol, (DRISDOL) 1.25 MG (96252 UT) Cap capsule, Take 1 Cap by mouth every 7 days., Disp: 12 Cap, Rfl: 0  •  ketoconazole (NIZORAL) 2 % Cream, Apply 1 Application to affected area(s) every day., Disp: 3 g, Rfl: 2  •  Blood Glucose Test Strips, Use one One Touch Verio strip to test blood sugar once daily early morning before first meal., Disp: 100 Strip, Rfl: 2  •  Lancets, Use one Insurance preferred lancet to test blood sugar once daily early morning before first meal., Disp: 100 Each, Rfl: 2  •  ONETOUCH VERIO strip, USE TO TEST BLOOD SUGAR ONCE A DAY IN EARLY  MORNING BEFORE FIRST MEAL, Disp: , Rfl:   •  Blood Glucose Monitoring Suppl (ONETOUCH VERIO) w/Device Kit, USE TO TEST BLOOD SUGAR ONCE A DAY IN EARLY MORNING BEFORE FIRST MEAL, Disp: , Rfl:   •  Blood Glucose Meter Kit, Test blood sugar as recommended by provider.Insurance preferred blood glucose monitoring kit., Disp: 1 Kit, Rfl: 0  •  Alcohol Swabs, Wipe site with prep pad prior to injection., Disp: 100 Each, Rfl: 2  •  glipiZIDE (GLUCOTROL) 5 MG Tab, Take 1 Tab by mouth every day., Disp: 90 Tab, Rfl: 0    Social History:  Social History     Socioeconomic History   • Marital status: Single     Spouse name: Not on file   • Number of children: Not on file   • Years of education: Not on file   • Highest education level: Not on file   Occupational History     Comment:    Social Needs   • Financial resource strain: Not on file   • Food insecurity     Worry: Not on file     Inability: Not on file   • Transportation needs     Medical: Not on file     Non-medical: Not on file   Tobacco Use   • Smoking status: Former Smoker     Packs/day: 0.50     Years: 10.00     Pack years: 5.00     Quit date:      Years since quittin.6   • Smokeless tobacco: Never Used   Substance and Sexual Activity   • Alcohol use: Not Currently   • Drug use: Never   • Sexual activity: Not Currently     Partners: Female   Lifestyle   • Physical activity     Days per week: Not on file     Minutes per session: Not on file   • Stress: Not on file   Relationships   • Social connections     Talks on phone: Not on file     Gets together: Not on file     Attends Yarsani service: Not on file     Active member of club or organization: Not on file     Attends meetings of clubs or organizations: Not on file     Relationship status: Not on file   • Intimate partner violence     Fear of current or ex partner: Not on file     Emotionally abused: Not on file     Physically abused: Not on file     Forced sexual activity: Not on file   Other  "Topics Concern   • Not on file   Social History Narrative   • Not on file        Family History:   Family History   Problem Relation Age of Onset   • Hypertension Father    • Hypertension Brother    • Diabetes Brother    • Hypertension Brother    • Diabetes Brother        PHYSICAL EXAM:   Vital signs: /90 (BP Location: Left arm, Patient Position: Sitting, BP Cuff Size: Large adult)   Pulse 71   Ht 1.781 m (5' 10.1\")   Wt 108.9 kg (240 lb 1.6 oz)   SpO2 96%   BMI 34.35 kg/m²   GENERAL: Obese  male  in no apparent distress.   EYE: No ocular and eyelid asymmetry, Anicteric sclerae,  PERRL, No exophthalmos or lidlag  HENT: Hearing grossly intact, Normocephalic, atraumatic. Pink, moist mucous membranes, No exudate  NECK: Supple. Trachea midline. thyroid is normal in size without nodules or tenderness  CARDIOVASCULAR: Regular rate and rhythm. No murmurs, rubs, or gallops.   LUNGS: Clear to auscultation bilaterally   ABDOMEN: Soft, nontender with positive bowel sounds.   EXTREMITIES: No clubbing, cyanosis, or edema.   NEUROLOGICAL: Cranial nerves II-XII are grossly intact   Symmetric reflexes at the patella no proximal muscle weakness, No visible tremor with both outstretched hands  LYMPH: No cervical, supraclavicular,  adenopathy palpated.   SKIN: No rashes, lesions. Turgor is normal.  Visible Acanthosis  FOOT: Normal sensation to monofilament testing, normal pulses, no ulcers.  Normal Vibration quantitative sensation test.    Labs:  Lab Results   Component Value Date/Time    HBA1C 6.4 (A) 08/10/2020 1527    AVGLUC 246 05/29/2020 1517       Lab Results   Component Value Date/Time    WBC 8.1 05/29/2020 03:17 PM    RBC 4.66 (L) 05/29/2020 03:17 PM    HEMOGLOBIN 15.0 05/29/2020 03:17 PM    MCV 91.6 05/29/2020 03:17 PM    MCH 32.2 05/29/2020 03:17 PM    MCHC 35.1 05/29/2020 03:17 PM    RDW 40.8 05/29/2020 03:17 PM    MPV 10.6 05/29/2020 03:17 PM       Lab Results   Component Value Date/Time    SODIUM 140 " 08/03/2020 09:38 AM    POTASSIUM 3.5 (L) 08/03/2020 09:38 AM    CHLORIDE 101 08/03/2020 09:38 AM    CO2 27 08/03/2020 09:38 AM    ANION 12.0 08/03/2020 09:38 AM    GLUCOSE 110 (H) 08/03/2020 09:38 AM    BUN 12 08/03/2020 09:38 AM    CREATININE 0.70 08/03/2020 09:38 AM    CALCIUM 8.8 08/03/2020 09:38 AM    ASTSGOT 21 08/03/2020 09:38 AM    ALTSGPT 34 08/03/2020 09:38 AM    TBILIRUBIN 0.7 08/03/2020 09:38 AM    ALBUMIN 4.4 08/03/2020 09:38 AM    TOTPROTEIN 7.0 08/03/2020 09:38 AM    GLOBULIN 2.6 08/03/2020 09:38 AM    AGRATIO 1.7 08/03/2020 09:38 AM       Lab Results   Component Value Date/Time    CHOLSTRLTOT 134 06/27/2020 0935    TRIGLYCERIDE 58 06/27/2020 0935    HDL 32 (A) 06/27/2020 0935    LDL 90 06/27/2020 0935       Lab Results   Component Value Date/Time    MALBCRT 7 06/27/2020 09:35 AM    MICROALBUR 1.9 06/27/2020 09:35 AM        Lab Results   Component Value Date/Time    TSHULTRASEN 0.872 05/29/2020 1517     No results found for: FREEDIR  No results found for: FREET3  No results found for: THYSTIMIG      ASSESSMENT/PLAN:     1. Type 2 diabetes mellitus without complication, without long-term current use of insulin (HCC)  Previously uncontrolled now with improved control overall  Reviewed pathogenesis of DM and importance of good glucose control.   Recommend replacing Glipizide with better medications associated with Weight loss benefit, CV benefits and low risk of hypoglycemia  Stop glipizide  Start Ozempic 0.25mg weekly  Reviewed SE of Glp-1  Continue metformin 850mg bid  Recommend that he watch his carb intake and exercise  Will plan for follow up in 6 weeks to reassess BG control      2. Dyslipidemia  Controled  Continue atorvastatin, repeat fasting lipids in 12 months    3. Long term (current) use of oral hypoglycemic drugs  Patient is on mutiple oral agents for t2dm mgt      Return in about 6 weeks (around 9/21/2020).       This patient during there office visit was started on new medication.  Side  effects of new medications were discussed with the patient today in the office. The patient was supplied paperwork on this new medication.    Thank you kindly for allowing me to participate in the diabetes care plan for this patient.    Archie Em MD, FACE, [facility]  08/10/20    CC:   Jayy Franco M.D.

## 2020-08-17 ENCOUNTER — TELEPHONE (OUTPATIENT)
Dept: HEALTH INFORMATION MANAGEMENT | Facility: MEDICAL CENTER | Age: 45
End: 2020-08-17

## 2020-08-17 RX ORDER — DULAGLUTIDE 0.75 MG/.5ML
1 INJECTION, SOLUTION SUBCUTANEOUS
Qty: 1.96 ML | Refills: 11 | Status: SHIPPED | OUTPATIENT
Start: 2020-08-17 | End: 2020-10-06 | Stop reason: SDUPTHER

## 2020-08-17 NOTE — TELEPHONE ENCOUNTER
Ozempic not covered, patient cost >$600 with coupon.  Trulicity covered.  He has given 2 or 3 weeks of Ozempic 0.25 mg.  Coupon card emailed to Carlos Durham order routed to Dr. Kraft

## 2020-08-22 ENCOUNTER — HOSPITAL ENCOUNTER (OUTPATIENT)
Dept: LAB | Facility: MEDICAL CENTER | Age: 45
End: 2020-08-22
Attending: INTERNAL MEDICINE
Payer: COMMERCIAL

## 2020-08-22 DIAGNOSIS — E87.6 HYPOKALEMIA: ICD-10-CM

## 2020-08-22 DIAGNOSIS — I10 ESSENTIAL HYPERTENSION: ICD-10-CM

## 2020-08-22 DIAGNOSIS — E11.9 TYPE 2 DIABETES MELLITUS WITHOUT COMPLICATION, WITHOUT LONG-TERM CURRENT USE OF INSULIN (HCC): ICD-10-CM

## 2020-08-22 LAB
ANION GAP SERPL CALC-SCNC: 12 MMOL/L (ref 7–16)
BUN SERPL-MCNC: 16 MG/DL (ref 8–22)
CALCIUM SERPL-MCNC: 9 MG/DL (ref 8.5–10.5)
CHLORIDE SERPL-SCNC: 105 MMOL/L (ref 96–112)
CO2 SERPL-SCNC: 24 MMOL/L (ref 20–33)
CREAT SERPL-MCNC: 0.7 MG/DL (ref 0.5–1.4)
EST. AVERAGE GLUCOSE BLD GHB EST-MCNC: 134 MG/DL
GLUCOSE SERPL-MCNC: 97 MG/DL (ref 65–99)
HBA1C MFR BLD: 6.3 % (ref 0–5.6)
MAGNESIUM SERPL-MCNC: 1.8 MG/DL (ref 1.5–2.5)
POTASSIUM SERPL-SCNC: 3.5 MMOL/L (ref 3.6–5.5)
SODIUM SERPL-SCNC: 141 MMOL/L (ref 135–145)

## 2020-08-22 PROCEDURE — 83036 HEMOGLOBIN GLYCOSYLATED A1C: CPT

## 2020-08-22 PROCEDURE — 80048 BASIC METABOLIC PNL TOTAL CA: CPT

## 2020-08-22 PROCEDURE — 83735 ASSAY OF MAGNESIUM: CPT

## 2020-08-22 PROCEDURE — 36415 COLL VENOUS BLD VENIPUNCTURE: CPT

## 2020-08-27 DIAGNOSIS — I10 ESSENTIAL HYPERTENSION: ICD-10-CM

## 2020-08-27 RX ORDER — AMLODIPINE BESYLATE 5 MG/1
TABLET ORAL
Qty: 30 TAB | Refills: 0 | Status: SHIPPED | OUTPATIENT
Start: 2020-08-27 | End: 2020-09-01 | Stop reason: SDUPTHER

## 2020-08-27 NOTE — TELEPHONE ENCOUNTER
Received request via: Pharmacy    Was the patient seen in the last year in this department? Yes    Does the patient have an active prescription (recently filled or refills available) for medication(s) requested? Yes, runs through 9/4

## 2020-09-01 ENCOUNTER — OFFICE VISIT (OUTPATIENT)
Dept: MEDICAL GROUP | Facility: MEDICAL CENTER | Age: 45
End: 2020-09-01
Payer: COMMERCIAL

## 2020-09-01 VITALS
WEIGHT: 236.33 LBS | SYSTOLIC BLOOD PRESSURE: 120 MMHG | OXYGEN SATURATION: 97 % | RESPIRATION RATE: 16 BRPM | BODY MASS INDEX: 33.09 KG/M2 | HEIGHT: 71 IN | HEART RATE: 72 BPM | TEMPERATURE: 97.5 F | DIASTOLIC BLOOD PRESSURE: 86 MMHG

## 2020-09-01 DIAGNOSIS — E78.5 DYSLIPIDEMIA: ICD-10-CM

## 2020-09-01 DIAGNOSIS — B35.3 TINEA PEDIS OF BOTH FEET: ICD-10-CM

## 2020-09-01 DIAGNOSIS — E87.6 HYPOKALEMIA: ICD-10-CM

## 2020-09-01 DIAGNOSIS — I10 ESSENTIAL HYPERTENSION: ICD-10-CM

## 2020-09-01 DIAGNOSIS — E55.9 VITAMIN D DEFICIENCY: ICD-10-CM

## 2020-09-01 DIAGNOSIS — E11.9 TYPE 2 DIABETES MELLITUS WITHOUT COMPLICATION, WITHOUT LONG-TERM CURRENT USE OF INSULIN (HCC): ICD-10-CM

## 2020-09-01 PROCEDURE — 99214 OFFICE O/P EST MOD 30 MIN: CPT | Performed by: INTERNAL MEDICINE

## 2020-09-01 RX ORDER — AMLODIPINE BESYLATE 5 MG/1
5 TABLET ORAL
Qty: 30 TAB | Refills: 5 | Status: SHIPPED | OUTPATIENT
Start: 2020-09-01 | End: 2021-01-14 | Stop reason: SDUPTHER

## 2020-09-01 RX ORDER — ATORVASTATIN CALCIUM 10 MG/1
10 TABLET, FILM COATED ORAL DAILY
Qty: 90 TAB | Refills: 1 | Status: SHIPPED | OUTPATIENT
Start: 2020-09-01 | End: 2021-01-14 | Stop reason: SDUPTHER

## 2020-09-01 RX ORDER — POTASSIUM CHLORIDE 20 MEQ/1
20 TABLET, EXTENDED RELEASE ORAL DAILY
Qty: 2 TAB | Refills: 0 | Status: SHIPPED | OUTPATIENT
Start: 2020-09-01 | End: 2020-09-03

## 2020-09-01 RX ORDER — KETOCONAZOLE 20 MG/G
1 CREAM TOPICAL DAILY
Qty: 3 G | Refills: 2 | Status: SHIPPED | OUTPATIENT
Start: 2020-09-01 | End: 2021-07-12 | Stop reason: SDUPTHER

## 2020-09-01 ASSESSMENT — FIBROSIS 4 INDEX: FIB4 SCORE: 0.9

## 2020-09-01 NOTE — ASSESSMENT & PLAN NOTE
DASH diet  Stable on amlodipine   Monitor BP with upper arm cuff  Denies any chest pain, shortness of breath, leg swelling, lightheadedness, dizziness.  Monitor BMP

## 2020-09-01 NOTE — ASSESSMENT & PLAN NOTE
Type 2, diagnosed on 5/29/2020.   AYAH and insulin Ab are both negative.  Following with endocrinology    diabetic diet, exercise, medications first  metformin 850 BID   Was on Ozempic, however, due to insurance switching to trulicity soon     Ref. Range 5/29/2020 15:17   Glycohemoglobin Latest Ref Range: 0.0 - 5.6 % 10.2 (H)   Estim. Avg Glu Latest Units: mg/dL 246      Ref. Range 8/22/2020 09:49   Glycohemoglobin Latest Ref Range: 0.0 - 5.6 % 6.3 (H)   Estim. Avg Glu Latest Units: mg/dL 134     Monofilament intact on 6/12/2020  Feet: tinea pedis, topical antifungal

## 2020-09-01 NOTE — ASSESSMENT & PLAN NOTE
Ref. Range 8/22/2020 09:49   Sodium Latest Ref Range: 135 - 145 mmol/L 141   Potassium Latest Ref Range: 3.6 - 5.5 mmol/L 3.5 (L)   Chloride Latest Ref Range: 96 - 112 mmol/L 105   Co2 Latest Ref Range: 20 - 33 mmol/L 24   Anion Gap Latest Ref Range: 7.0 - 16.0  12.0   Glucose Latest Ref Range: 65 - 99 mg/dL 97   Bun Latest Ref Range: 8 - 22 mg/dL 16   Creatinine Latest Ref Range: 0.50 - 1.40 mg/dL 0.70   GFR If  Latest Ref Range: >60 mL/min/1.73 m 2 >60   GFR If Non  Latest Ref Range: >60 mL/min/1.73 m 2 >60   Calcium Latest Ref Range: 8.5 - 10.5 mg/dL 9.0   Magnesium Latest Ref Range: 1.5 - 2.5 mg/dL 1.8   Replace and monitor     no

## 2020-09-01 NOTE — LETTER
[facility]  Jayy Franco M.D.  [address]  [place] [address]  Fax: [phone]   Authorization for Release/Disclosure of   Protected Health Information   Name: ARVIND CARVALHO : 1975 SSN: [id number]   Address: [facility] [address]   [place] [address] Phone:    [phone] (home)    I authorize the entity listed below to release/disclose the PHI below to:   [facility]/Jayy Franco M.D. and Jayy Franco M.D.   Provider or Entity Name:     Address   City, State, [facility]   Phone:      Fax:     Reason for request: continuity of care   Information to be released:    [  ] LAST COLONOSCOPY,  including any PATH REPORT and follow-up  [  ] LAST FIT/COLOGUARD RESULT [  ] LAST DEXA  [  ] LAST MAMMOGRAM  [  ] LAST PAP  [  ] LAST LABS [  ] RETINA EXAM REPORT  [  ] IMMUNIZATION RECORDS  [  ] Release all info      [  ] Check here and initial the line next to each item to release ALL health information INCLUDING  _____ Care and treatment for drug and / or alcohol abuse  _____ HIV testing, infection status, or AIDS  _____ Genetic Testing    DATES OF SERVICE OR TIME PERIOD TO BE DISCLOSED: _____________  I understand and acknowledge that:  * This Authorization may be revoked at any time by you in writing, except if your health information has already been used or disclosed.  * Your health information that will be used or disclosed as a result of you signing this authorization could be re-disclosed by the recipient. If this occurs, your re-disclosed health information may no longer be protected by State or Federal laws.  * You may refuse to sign this Authorization. Your refusal will not affect your ability to obtain treatment.  * This Authorization becomes effective upon signing and will  on (date) __________.      If no date is indicated, this Authorization will  one (1) year from the signature date.    Name: Arvind Carvalho    Signature:   Date:          9/1/2020       PLEASE FAX REQUESTED RECORDS BACK TO: [phone]

## 2020-09-01 NOTE — ASSESSMENT & PLAN NOTE
The 10-year ASCVD risk score (Jason PRIETO Jr., et al., 2013) is: 3.4%    Values used to calculate the score:      Age: 45 years      Sex: Male      Is Non- : No      Diabetic: Yes      Tobacco smoker: No      Systolic Blood Pressure: 120 mmHg      Is BP treated: No      HDL Cholesterol: 32 mg/dL      Total Cholesterol: 134 mg/dL  On statin, tolerating well

## 2020-09-01 NOTE — PROGRESS NOTES
Established Patient    Arvind Jesus is a 45 y.o. male who presents today with the following:    CC:   Chief Complaint   Patient presents with   • Follow-Up     HBP, Diabetes       HPI: Croatian interpretor service used throughout the visit    Tinea pedis of both feet  Scaly soles, cracks, left worse than right  Improving with ketoconazole topical        Essential hypertension  DASH diet  Stable on amlodipine   Monitor BP with upper arm cuff  Denies any chest pain, shortness of breath, leg swelling, lightheadedness, dizziness.  Monitor BMP      Dyslipidemia  The 10-year ASCVD risk score (Greenwood IDA Jr., et al., 2013) is: 3.4%    Values used to calculate the score:      Age: 45 years      Sex: Male      Is Non- : No      Diabetic: Yes      Tobacco smoker: No      Systolic Blood Pressure: 120 mmHg      Is BP treated: No      HDL Cholesterol: 32 mg/dL      Total Cholesterol: 134 mg/dL  On statin, tolerating well        Vitamin D deficiency     Ref. Range 8/3/2020 09:38   25-Hydroxy   Vitamin D 25 Latest Ref Range: 30 - 100 ng/mL 19 (L)   Recommend Vitamin D3 50,000 international units weekly for 12 weeks, then vitamin D3 3747-6792 international units daily        Hypokalemia     Ref. Range 8/22/2020 09:49   Sodium Latest Ref Range: 135 - 145 mmol/L 141   Potassium Latest Ref Range: 3.6 - 5.5 mmol/L 3.5 (L)   Chloride Latest Ref Range: 96 - 112 mmol/L 105   Co2 Latest Ref Range: 20 - 33 mmol/L 24   Anion Gap Latest Ref Range: 7.0 - 16.0  12.0   Glucose Latest Ref Range: 65 - 99 mg/dL 97   Bun Latest Ref Range: 8 - 22 mg/dL 16   Creatinine Latest Ref Range: 0.50 - 1.40 mg/dL 0.70   GFR If  Latest Ref Range: >60 mL/min/1.73 m 2 >60   GFR If Non  Latest Ref Range: >60 mL/min/1.73 m 2 >60   Calcium Latest Ref Range: 8.5 - 10.5 mg/dL 9.0   Magnesium Latest Ref Range: 1.5 - 2.5 mg/dL 1.8   Replace and monitor      Type 2 diabetes mellitus without complication,  without long-term current use of insulin (HCC)  Type 2, diagnosed on 5/29/2020.   AYAH and insulin Ab are both negative.  Following with endocrinology    diabetic diet, exercise, medications first  metformin 850 BID   Was on Ozempic, however, due to insurance switching to trulicity soon     Ref. Range 5/29/2020 15:17   Glycohemoglobin Latest Ref Range: 0.0 - 5.6 % 10.2 (H)   Estim. Avg Glu Latest Units: mg/dL 246      Ref. Range 8/22/2020 09:49   Glycohemoglobin Latest Ref Range: 0.0 - 5.6 % 6.3 (H)   Estim. Avg Glu Latest Units: mg/dL 134     Monofilament intact on 6/12/2020  Feet: tinea pedis, topical antifungal          Current Outpatient Medications   Medication Sig Dispense Refill   • potassium chloride SA (KDUR) 20 MEQ Tab CR Take 1 Tab by mouth every day for 2 days. 2 Tab 0   • amLODIPine (NORVASC) 5 MG Tab Take 1 Tab by mouth every day. 30 Tab 5   • atorvastatin (LIPITOR) 10 MG Tab Take 1 Tab by mouth every day. 90 Tab 1   • ketoconazole (NIZORAL) 2 % Cream Apply 1 Application to affected area(s) every day. 3 g 2   • metFORMIN (GLUCOPHAGE) 850 MG Tab Take 1 Tab by mouth 2 times a day, with meals. 180 Tab 1   • vitamin D, Ergocalciferol, (DRISDOL) 1.25 MG (52450 UT) Cap capsule Take 1 Cap by mouth every 7 days. 12 Cap 0   • Blood Glucose Test Strips Use one One Touch Verio strip to test blood sugar once daily early morning before first meal. 100 Strip 2   • Lancets Use one Insurance preferred lancet to test blood sugar once daily early morning before first meal. 100 Each 2   • ONETOUCH VERIO strip USE TO TEST BLOOD SUGAR ONCE A DAY IN EARLY MORNING BEFORE FIRST MEAL     • Blood Glucose Monitoring Suppl (ONETOUCH VERIO) w/Device Kit USE TO TEST BLOOD SUGAR ONCE A DAY IN EARLY MORNING BEFORE FIRST MEAL     • Blood Glucose Meter Kit Test blood sugar as recommended by provider.Insurance preferred blood glucose monitoring kit. 1 Kit 0   • Alcohol Swabs Wipe site with prep pad prior to injection. 100 Each 2   •  "Dulaglutide (TRULICITY) 0.75 MG/0.5ML Solution Pen-injector Inject 1 Each as instructed every 7 days. (Patient not taking: Reported on 9/1/2020) 1.96 mL 11     No current facility-administered medications for this visit.        Allergies, past medical history, past surgical history, medications, family history, social history reviewed and updated.    ROS   Constitutional: Denies fevers or chills  Eyes: Denies changes in vision  Ears/Nose/Throat/Mouth: Denies nasal congestion or sore throat   Cardiovascular: Denies chest pain or palpitations   Respiratory: Denies shortness of breath , Denies cough  Gastrointestinal/Hepatic: Denies abd pain, nausea, vomiting   Genitourinary: Denies dysuria or frequency  Musculoskeletal/Rheum: Denies joint pain and swelling   Neurological: Denies headache  Psychiatric: Denies mood disorder   Endocrine:  hx of diabetes Denies thyroid dysfunction  Heme/Oncology/Lymph Nodes: Denies weight changes or enlarged LNs.    Physical Exam  Vitals: /86 (BP Location: Left arm, Patient Position: Sitting, BP Cuff Size: Adult)   Pulse 72   Temp 36.4 °C (97.5 °F) (Temporal)   Resp 16   Ht 1.791 m (5' 10.51\")   Wt 107.2 kg (236 lb 5.3 oz) Comment: wearing shoes  SpO2 97%   BMI 33.42 kg/m²   General: Alert, pleasant, NAD  HEENT: Normocephalic.  EOMI, no icterus or pallor.  Conjunctivae and lids normal. External ears normal. Oropharynx non-erythematous, mucous membranes moist.  Neck supple.  No thyromegaly or masses palpated.   Lymph: No cervical or supraclavicular lymphadenopathy.  Cardiovascular: Regular rate and rhythm.    Respiratory: Normal respiratory effort.  Clear to auscultation bilaterally.  Abdomen: Non-distended, soft, non-tender  Skin: Warm, dry, scaly rash on sole left worse than right  Musculoskeletal: Gait is normal.  Moves all extremities well.  Extremities: No leg edema.    Psych:  Affect/mood is normal, judgement is good, memory is intact, grooming is appropriate.      Labs " (8/22/20) were reviewed and discussed with patients.  All questions were answered.      Assessment and Plan    1. Hypokalemia  - potassium chloride SA (KDUR) 20 MEQ Tab CR; Take 1 Tab by mouth every day for 2 days.  Dispense: 2 Tab; Refill: 0  - Comp Metabolic Panel; Future    2. Essential hypertension  - amLODIPine (NORVASC) 5 MG Tab; Take 1 Tab by mouth every day.  Dispense: 30 Tab; Refill: 5  - Comp Metabolic Panel; Future    3. Type 2 diabetes mellitus without complication, without long-term current use of insulin (HCC)  - atorvastatin (LIPITOR) 10 MG Tab; Take 1 Tab by mouth every day.  Dispense: 90 Tab; Refill: 1  Diabetic diet, continue metformin and trulicity  Follow with endocrinology    4. Dyslipidemia  - atorvastatin (LIPITOR) 10 MG Tab; Take 1 Tab by mouth every day.  Dispense: 90 Tab; Refill: 1    5. Tinea pedis of both feet  - ketoconazole (NIZORAL) 2 % Cream; Apply 1 Application to affected area(s) every day.  Dispense: 3 g; Refill: 2    6. Vitamin D deficiency  Recommend Vitamin D3 50,000 international units weekly for 12 weeks, then vitamin D3 6942-6305 international units daily      Follow-up:Return in about 3 months (around 12/1/2020), or if symptoms worsen or fail to improve.    This note was created using voice recognition software. There may be unintended errors in spelling, grammar or content.

## 2020-09-01 NOTE — ASSESSMENT & PLAN NOTE
Ref. Range 8/3/2020 09:38   25-Hydroxy   Vitamin D 25 Latest Ref Range: 30 - 100 ng/mL 19 (L)   Recommend Vitamin D3 50,000 international units weekly for 12 weeks, then vitamin D3 1319-4717 international units daily

## 2020-10-06 ENCOUNTER — OFFICE VISIT (OUTPATIENT)
Dept: ENDOCRINOLOGY | Facility: MEDICAL CENTER | Age: 45
End: 2020-10-06
Attending: INTERNAL MEDICINE
Payer: COMMERCIAL

## 2020-10-06 VITALS
OXYGEN SATURATION: 98 % | DIASTOLIC BLOOD PRESSURE: 88 MMHG | BODY MASS INDEX: 33.79 KG/M2 | HEART RATE: 72 BPM | WEIGHT: 236 LBS | HEIGHT: 70 IN | SYSTOLIC BLOOD PRESSURE: 132 MMHG

## 2020-10-06 DIAGNOSIS — E78.5 DYSLIPIDEMIA: ICD-10-CM

## 2020-10-06 DIAGNOSIS — E11.9 CONTROLLED TYPE 2 DIABETES MELLITUS WITHOUT COMPLICATION, WITHOUT LONG-TERM CURRENT USE OF INSULIN (HCC): ICD-10-CM

## 2020-10-06 DIAGNOSIS — E55.9 VITAMIN D DEFICIENCY: ICD-10-CM

## 2020-10-06 DIAGNOSIS — Z79.84 LONG TERM (CURRENT) USE OF ORAL HYPOGLYCEMIC DRUGS: ICD-10-CM

## 2020-10-06 PROCEDURE — 99214 OFFICE O/P EST MOD 30 MIN: CPT | Performed by: INTERNAL MEDICINE

## 2020-10-06 PROCEDURE — 99211 OFF/OP EST MAY X REQ PHY/QHP: CPT | Performed by: INTERNAL MEDICINE

## 2020-10-06 RX ORDER — DULAGLUTIDE 0.75 MG/.5ML
1 INJECTION, SOLUTION SUBCUTANEOUS
Qty: 1.96 ML | Refills: 11 | Status: SHIPPED | OUTPATIENT
Start: 2020-10-06 | End: 2021-08-13

## 2020-10-06 ASSESSMENT — FIBROSIS 4 INDEX: FIB4 SCORE: 0.9

## 2020-10-06 NOTE — PROGRESS NOTES
CHIEF COMPLAINT: Patient is here for follow up of Type 2 Diabetes Mellitus    HPI:     Arvind Jesus is a 45 y.o. male with Type 2 Diabetes Mellitus here for follow up.    Labs from 8/22/2020 HbA1c is better at 6.3%      On follow-up he is now on Metformin 850 mg twice a day and Trulicity 0.75 mg weekly.    Last visit I discontinued his glipizide and replace it with a GLP-1 analog for weight loss and cardiovascular benefit    Ozempic was too expensive and was not covered by his insurance so we switch him to Trulicity    On follow-up today he reports that he is doing well his blood sugars range from  in the morning and at bedtime    He reports approximately 2 pound weight loss since I last saw him  He feels great      He has hyperlipidemia and is on atorvastatin 10 mg daily  He denies history of coronary artery disease.  He denies statin related myopathy symptoms  His last LDL cholesterol was 90 with triglycerides of 58 on June 2020      He does not have diabetic nephropathy on his last labs his urine microalbumin creatinine ratio was 7 on June 2020      He is going to have an eye exam next month but he does not recall the details of his eye doctor      BG Diary:10/06/20  Breakfast: 90, 92, 100, 110    Weight has decreased 2 pounds over last 6 weeks    Diabetes Complications   Retinopathy: No known retinopathy.  Last eye exam: Patient reports an upcoming eye exam appointment but he does not recall the details  Neuropathy: Denies paresthesias or numbness in hands or feet. Denies any foot wounds.  Exercise: Minimal.  Diet: Fair.  Patient's medications, allergies, and social histories were reviewed and updated as appropriate.    ROS:     CONS:     No fever, no chills   EYES:     No diplopia, no blurry vision   CV:           No chest pain, no palpitations   PULM:     No SOB, no cough, no hemoptysis.   GI:            No nausea, no vomiting, no diarrhea, no constipation   ENDO:     No polyuria, no polydipsia,  "no heat intolerance, no cold intolerance       Past Medical History:  Problem List:  2020: Long term (current) use of oral hypoglycemic drugs  2020: Vitamin D deficiency  2020: Hypokalemia  2020: Dyslipidemia  2020: Controlled type 2 diabetes mellitus without complication,   without long-term current use of insulin ([facility])  2020: Class 1 obesity with body mass index (BMI) of 33.0 to 33.9   in adult  2020: Essential hypertension  2020: Tinea pedis of both feet      Past Surgical History:  No past surgical history on file.     Allergies:  Patient has no known allergies.     Social History:  Social History     Tobacco Use   • Smoking status: Former Smoker     Packs/day: 0.50     Years: 10.00     Pack years: 5.00     Quit date:      Years since quittin.7   • Smokeless tobacco: Never Used   Substance Use Topics   • Alcohol use: Not Currently   • Drug use: Never        Family History:   family history includes Diabetes in his brother and brother; Hypertension in his brother, brother, and father.      PHYSICAL EXAM:   OBJECTIVE:  Vital signs: /88 (BP Location: Left arm, Patient Position: Sitting, BP Cuff Size: Adult)   Pulse 72   Ht 1.778 m (5' 10\")   Wt 107 kg (236 lb)   SpO2 98%   BMI 33.86 kg/m²   GENERAL: Well-developed, well-nourished in no apparent distress.   EYE:  No ocular asymmetry, PERRLA  HENT: Pink, moist mucous membranes.    NECK: No thyromegaly.   CARDIOVASCULAR:  No murmurs  LUNGS: Clear breath sounds  ABDOMEN: Soft, nontender   EXTREMITIES: No clubbing, cyanosis, or edema.   NEUROLOGICAL: No gross focal motor abnormalities   LYMPH: No cervical adenopathy palpated.   SKIN: No rashes, lesions.   Monofilament testing with a 10 gram force: sensation: intact bilaterally  Visual Inspection: Feet without maceration, ulcers, or fissures.  Pedal pulses: intact bilaterally    Labs:  Lab Results   Component Value Date/Time    HBA1C 6.3 (H) 2020 09:49 AM        Lab " Results   Component Value Date/Time    WBC 8.1 05/29/2020 03:17 PM    RBC 4.66 (L) 05/29/2020 03:17 PM    HEMOGLOBIN 15.0 05/29/2020 03:17 PM    MCV 91.6 05/29/2020 03:17 PM    MCH 32.2 05/29/2020 03:17 PM    MCHC 35.1 05/29/2020 03:17 PM    RDW 40.8 05/29/2020 03:17 PM    MPV 10.6 05/29/2020 03:17 PM       Lab Results   Component Value Date/Time    SODIUM 141 08/22/2020 09:49 AM    POTASSIUM 3.5 (L) 08/22/2020 09:49 AM    CHLORIDE 105 08/22/2020 09:49 AM    CO2 24 08/22/2020 09:49 AM    ANION 12.0 08/22/2020 09:49 AM    GLUCOSE 97 08/22/2020 09:49 AM    BUN 16 08/22/2020 09:49 AM    CREATININE 0.70 08/22/2020 09:49 AM    CALCIUM 9.0 08/22/2020 09:49 AM    ASTSGOT 21 08/03/2020 09:38 AM    ALTSGPT 34 08/03/2020 09:38 AM    TBILIRUBIN 0.7 08/03/2020 09:38 AM    ALBUMIN 4.4 08/03/2020 09:38 AM    TOTPROTEIN 7.0 08/03/2020 09:38 AM    GLOBULIN 2.6 08/03/2020 09:38 AM    AGRATIO 1.7 08/03/2020 09:38 AM       Lab Results   Component Value Date/Time    CHOLSTRLTOT 134 06/27/2020 0935    TRIGLYCERIDE 58 06/27/2020 0935    HDL 32 (A) 06/27/2020 0935    LDL 90 06/27/2020 0935       Lab Results   Component Value Date/Time    MALBCRT 7 06/27/2020 09:35 AM    MICROALBUR 1.9 06/27/2020 09:35 AM        Lab Results   Component Value Date/Time    TSHULTRASEN 0.872 05/29/2020 1517     No results found for: FREEDIR  No results found for: FREET3  No results found for: THYSTIMIG        ASSESSMENT/PLAN:     1. Controlled type 2 diabetes mellitus without complication, without long-term current use of insulin (HCC)  Well-controlled  Continue metformin and Trulicity  Stop Ozempic because this was not covered by his insurance  I gave him some samples of Trulicity  We have room to go up on this medication if necessary to 1.5 mg, 3 mg and even 4.5 mg weekly based upon new dosing  I recommend that he continue to watch his carb intake  I recommend that he start exercising regularly  I recommend that he get an eye exam this year  Foot exam was  completed  I will see him again in 3 months with repeat of his A1c      2. Dyslipidemia  Controlled  Continue atorvastatin  We will repeat fasting lipids in 3 to 6 months to assess compliance    3. Long term (current) use of oral hypoglycemic drugs  Patient is on oral agents and a GLP-1 for type 2 diabetes management      No follow-ups on file.      This patient during there office visit today was started on a new medication.  Side effects of the new medication were discussed with the patient today in the office.     Thank you kindly for allowing me to participate in the diabetes care plan for this patient.    Archie Em MD, FACE, [facility]  10/06/20    CC:   Jayy Franco M.D.

## 2021-01-06 ENCOUNTER — HOSPITAL ENCOUNTER (OUTPATIENT)
Dept: LAB | Facility: MEDICAL CENTER | Age: 46
End: 2021-01-06
Attending: INTERNAL MEDICINE
Payer: COMMERCIAL

## 2021-01-06 DIAGNOSIS — E78.5 DYSLIPIDEMIA: ICD-10-CM

## 2021-01-06 DIAGNOSIS — E55.9 VITAMIN D DEFICIENCY: ICD-10-CM

## 2021-01-06 DIAGNOSIS — Z79.84 LONG TERM (CURRENT) USE OF ORAL HYPOGLYCEMIC DRUGS: ICD-10-CM

## 2021-01-06 DIAGNOSIS — E11.9 CONTROLLED TYPE 2 DIABETES MELLITUS WITHOUT COMPLICATION, WITHOUT LONG-TERM CURRENT USE OF INSULIN (HCC): ICD-10-CM

## 2021-01-06 LAB
25(OH)D3 SERPL-MCNC: 17 NG/ML (ref 30–100)
ALBUMIN SERPL BCP-MCNC: 4.8 G/DL (ref 3.2–4.9)
ALBUMIN/GLOB SERPL: 1.8 G/DL
ALP SERPL-CCNC: 106 U/L (ref 30–99)
ALT SERPL-CCNC: 24 U/L (ref 2–50)
ANION GAP SERPL CALC-SCNC: 10 MMOL/L (ref 7–16)
AST SERPL-CCNC: 22 U/L (ref 12–45)
BILIRUB SERPL-MCNC: 0.7 MG/DL (ref 0.1–1.5)
BUN SERPL-MCNC: 10 MG/DL (ref 8–22)
CALCIUM SERPL-MCNC: 8.8 MG/DL (ref 8.5–10.5)
CHLORIDE SERPL-SCNC: 103 MMOL/L (ref 96–112)
CHOLEST SERPL-MCNC: 115 MG/DL (ref 100–199)
CO2 SERPL-SCNC: 26 MMOL/L (ref 20–33)
CREAT SERPL-MCNC: 0.71 MG/DL (ref 0.5–1.4)
CREAT UR-MCNC: 147.87 MG/DL
EST. AVERAGE GLUCOSE BLD GHB EST-MCNC: 120 MG/DL
FASTING STATUS PATIENT QL REPORTED: NORMAL
GLOBULIN SER CALC-MCNC: 2.6 G/DL (ref 1.9–3.5)
GLUCOSE SERPL-MCNC: 98 MG/DL (ref 65–99)
HBA1C MFR BLD: 5.8 % (ref 0–5.6)
HDLC SERPL-MCNC: 39 MG/DL
LDLC SERPL CALC-MCNC: 61 MG/DL
MICROALBUMIN UR-MCNC: <1.2 MG/DL
MICROALBUMIN/CREAT UR: NORMAL MG/G (ref 0–30)
POTASSIUM SERPL-SCNC: 3.7 MMOL/L (ref 3.6–5.5)
PROT SERPL-MCNC: 7.4 G/DL (ref 6–8.2)
SODIUM SERPL-SCNC: 139 MMOL/L (ref 135–145)
TRIGL SERPL-MCNC: 73 MG/DL (ref 0–149)

## 2021-01-06 PROCEDURE — 80061 LIPID PANEL: CPT

## 2021-01-06 PROCEDURE — 36415 COLL VENOUS BLD VENIPUNCTURE: CPT

## 2021-01-06 PROCEDURE — 82570 ASSAY OF URINE CREATININE: CPT

## 2021-01-06 PROCEDURE — 82043 UR ALBUMIN QUANTITATIVE: CPT

## 2021-01-06 PROCEDURE — 82306 VITAMIN D 25 HYDROXY: CPT

## 2021-01-06 PROCEDURE — 83036 HEMOGLOBIN GLYCOSYLATED A1C: CPT

## 2021-01-06 PROCEDURE — 80053 COMPREHEN METABOLIC PANEL: CPT

## 2021-01-12 ENCOUNTER — TELEPHONE (OUTPATIENT)
Dept: MEDICAL GROUP | Facility: MEDICAL CENTER | Age: 46
End: 2021-01-12

## 2021-01-12 ENCOUNTER — TELEMEDICINE (OUTPATIENT)
Dept: ENDOCRINOLOGY | Facility: MEDICAL CENTER | Age: 46
End: 2021-01-12
Attending: INTERNAL MEDICINE
Payer: COMMERCIAL

## 2021-01-12 DIAGNOSIS — E11.9 CONTROLLED TYPE 2 DIABETES MELLITUS WITHOUT COMPLICATION, WITHOUT LONG-TERM CURRENT USE OF INSULIN (HCC): ICD-10-CM

## 2021-01-12 NOTE — TELEPHONE ENCOUNTER
ESTABLISHED PATIENT PRE-VISIT PLANNING     Patient was NOT contacted to complete PVP.     Note: Patient will not be contacted if there is no indication to call.     1.  Reviewed notes from the last few office visits within the medical group: Yes    2.  If any orders were placed at last visit or intended to be done for this visit (i.e. 6 mos follow-up), do we have Results/Consult Notes?         •  Labs - Labs ordered, completed on 1/6/21 and results are in chart.  Note: If patient appointment is for lab review and patient did not complete labs, check with provider if OK to reschedule patient until labs completed.       •  Imaging - Imaging was not ordered at last office visit.       •  Referrals - No referrals were ordered at last office visit.    3. Is this appointment scheduled as a Hospital Follow-Up? No    4.  Immunizations were updated in Epic using Reconcile Outside Information activity? Yes    5.  Patient is due for the following Health Maintenance Topics:   Health Maintenance Due   Topic Date Due   • RETINAL SCREENING  08/06/1992   • IMM HEP B VACCINE (1 of 3 - Risk 3-dose series) 08/06/1993   • IMM INFLUENZA (1) 09/01/2020     6.  AHA (Pulse8) form printed for Provider? N/A

## 2021-01-14 ENCOUNTER — OFFICE VISIT (OUTPATIENT)
Dept: MEDICAL GROUP | Facility: MEDICAL CENTER | Age: 46
End: 2021-01-14
Payer: COMMERCIAL

## 2021-01-14 VITALS
RESPIRATION RATE: 16 BRPM | OXYGEN SATURATION: 98 % | BODY MASS INDEX: 34.24 KG/M2 | SYSTOLIC BLOOD PRESSURE: 128 MMHG | HEART RATE: 70 BPM | HEIGHT: 70 IN | WEIGHT: 239.2 LBS | TEMPERATURE: 98.8 F | DIASTOLIC BLOOD PRESSURE: 84 MMHG

## 2021-01-14 DIAGNOSIS — E66.9 CLASS 1 OBESITY WITHOUT SERIOUS COMORBIDITY WITH BODY MASS INDEX (BMI) OF 33.0 TO 33.9 IN ADULT, UNSPECIFIED OBESITY TYPE: ICD-10-CM

## 2021-01-14 DIAGNOSIS — E11.9 CONTROLLED TYPE 2 DIABETES MELLITUS WITHOUT COMPLICATION, WITHOUT LONG-TERM CURRENT USE OF INSULIN (HCC): ICD-10-CM

## 2021-01-14 DIAGNOSIS — E78.5 DYSLIPIDEMIA: ICD-10-CM

## 2021-01-14 DIAGNOSIS — E55.9 VITAMIN D DEFICIENCY: ICD-10-CM

## 2021-01-14 DIAGNOSIS — I10 ESSENTIAL HYPERTENSION: ICD-10-CM

## 2021-01-14 DIAGNOSIS — R74.8 ELEVATED ALKALINE PHOSPHATASE LEVEL: ICD-10-CM

## 2021-01-14 PROBLEM — E87.6 HYPOKALEMIA: Status: RESOLVED | Noted: 2020-08-04 | Resolved: 2021-01-14

## 2021-01-14 PROCEDURE — 99214 OFFICE O/P EST MOD 30 MIN: CPT | Performed by: INTERNAL MEDICINE

## 2021-01-14 RX ORDER — GLUCOSAMINE HCL/CHONDROITIN SU 500-400 MG
CAPSULE ORAL
Qty: 100 EACH | Refills: 2 | Status: SHIPPED | OUTPATIENT
Start: 2021-01-14

## 2021-01-14 RX ORDER — ERGOCALCIFEROL 1.25 MG/1
50000 CAPSULE ORAL
Qty: 12 CAP | Refills: 0 | Status: SHIPPED | OUTPATIENT
Start: 2021-01-14 | End: 2021-07-12

## 2021-01-14 RX ORDER — LANCETS 30 GAUGE
EACH MISCELLANEOUS
Qty: 100 EACH | Refills: 0 | Status: SHIPPED | OUTPATIENT
Start: 2021-01-14 | End: 2021-09-21

## 2021-01-14 RX ORDER — AMLODIPINE BESYLATE 5 MG/1
5 TABLET ORAL
Qty: 30 TAB | Refills: 5 | Status: SHIPPED | OUTPATIENT
Start: 2021-01-14 | End: 2021-07-12 | Stop reason: SDUPTHER

## 2021-01-14 RX ORDER — ATORVASTATIN CALCIUM 10 MG/1
10 TABLET, FILM COATED ORAL DAILY
Qty: 90 TAB | Refills: 1 | Status: SHIPPED | OUTPATIENT
Start: 2021-01-14 | End: 2021-09-21

## 2021-01-14 ASSESSMENT — FIBROSIS 4 INDEX: FIB4 SCORE: 1.12

## 2021-01-14 ASSESSMENT — PATIENT HEALTH QUESTIONNAIRE - PHQ9: CLINICAL INTERPRETATION OF PHQ2 SCORE: 0

## 2021-01-14 NOTE — PROGRESS NOTES
Established Patient    Arvind Jesus is a 45 y.o. male who presents today with the following:    CC:   Chief Complaint   Patient presents with   • Follow-Up       HPI:     Class 1 obesity with body mass index (BMI) of 33.0 to 33.9 in adult  Body mass index is 34.32 kg/m².  Lifestyle modifications        Controlled type 2 diabetes mellitus without complication, without long-term current use of insulin (HCC)  Type 2, diagnosed on 5/29/2020.   AYAH and insulin Ab are both negative.  Following with endocrinology    Diabetic diet, exercise  metformin 850 BID   Was on Ozempic, however, due to insurance switching to trulicity.  Trulicity 0.75 every 7 days     Ref. Range 5/29/2020 15:17   Glycohemoglobin Latest Ref Range: 0.0 - 5.6 % 10.2 (H)   Estim. Avg Glu Latest Units: mg/dL 246      Ref. Range 8/22/2020 09:49   Glycohemoglobin Latest Ref Range: 0.0 - 5.6 % 6.3 (H)   Estim. Avg Glu Latest Units: mg/dL 134      Ref. Range 1/6/2021 10:42   Glycohemoglobin Latest Ref Range: 0.0 - 5.6 % 5.8 (H)   Estim. Avg Glu Latest Units: mg/dL 120     Monofilament intact on 6/12/2020  Feet: tinea pedis, topical antifungal        Dyslipidemia    On statin, tolerating well        Essential hypertension  DASH diet  Stable on amlodipine   Monitor BP with upper arm cuff  Denies any chest pain, shortness of breath, leg swelling, lightheadedness, dizziness.        Vitamin D deficiency     Ref. Range 8/3/2020 09:38   25-Hydroxy   Vitamin D 25 Latest Ref Range: 30 - 100 ng/mL 19 (L)      Ref. Range 1/6/2021 10:42   25-Hydroxy   Vitamin D 25 Latest Ref Range: 30 - 100 ng/mL 17 (L)     Recommend Vitamin D3 50,000 international units weekly for 12 weeks, then vitamin D3 3598-8224 international units daily        Elevated alkaline phosphatase level     Ref. Range 8/3/2020 09:38 8/10/2020 15:27 8/22/2020 09:49 1/6/2021 10:41 1/6/2021 10:42   Alkaline Phosphatase Latest Ref Range: 30 - 99 U/L 100 (H)    106 (H)           Current Outpatient  Medications   Medication Sig Dispense Refill   • vitamin D, Ergocalciferol, (DRISDOL) 1.25 MG (03836 UT) Cap capsule Take 1 Cap by mouth every 7 days. 12 Cap 0   • atorvastatin (LIPITOR) 10 MG Tab Take 1 Tab by mouth every day. 90 Tab 1   • amLODIPine (NORVASC) 5 MG Tab Take 1 Tab by mouth every day. 30 Tab 5   • metFORMIN (GLUCOPHAGE) 850 MG Tab Take 1 Tab by mouth 2 times a day, with meals. 180 Tab 3   • Alcohol Swabs Wipe site with prep pad prior to injection. 100 Each 2   • Blood Glucose Test Strips Use one One Touch Verio strip to test blood sugar once daily early morning before first meal. 100 Each 3   • Lancets Use one One Touch Verio lancet to test blood sugar once daily early morning before first meal. 100 Each 0   • Dulaglutide (TRULICITY) 0.75 MG/0.5ML Solution Pen-injector Inject 1 Each as instructed every 7 days. 1.96 mL 11   • ketoconazole (NIZORAL) 2 % Cream Apply 1 Application to affected area(s) every day. 3 g 2   • Blood Glucose Test Strips Use one One Touch Verio strip to test blood sugar once daily early morning before first meal. 100 Strip 2   • Lancets Use one Insurance preferred lancet to test blood sugar once daily early morning before first meal. 100 Each 2   • ONETOUCH VERIO strip USE TO TEST BLOOD SUGAR ONCE A DAY IN EARLY MORNING BEFORE FIRST MEAL     • Blood Glucose Monitoring Suppl (ONETOUCH VERIO) w/Device Kit USE TO TEST BLOOD SUGAR ONCE A DAY IN EARLY MORNING BEFORE FIRST MEAL     • Blood Glucose Meter Kit Test blood sugar as recommended by provider.Insurance preferred blood glucose monitoring kit. 1 Kit 0     No current facility-administered medications for this visit.        Allergies, past medical history, past surgical history, medications, family history, social history reviewed and updated.    ROS   Constitutional: Denies fevers or chills  Eyes: Denies changes in vision  Ears/Nose/Throat/Mouth: Denies nasal congestion or sore throat   Cardiovascular: Denies chest pain or  "palpitations   Respiratory: Denies shortness of breath , Denies cough  Gastrointestinal/Hepatic: Denies abd pain, nausea, vomiting   Genitourinary: Denies dysuria or frequency  Musculoskeletal/Rheum: Denies joint pain and swelling   Neurological: Denies headache  Psychiatric: Denies mood disorder   Endocrine: hx of diabetes Denies thyroid dysfunction  Heme/Oncology/Lymph Nodes: Denies weight changes or enlarged LNs.    Physical Exam  Vitals: /84 (BP Location: Left arm, Patient Position: Sitting, BP Cuff Size: Adult)   Pulse 70   Temp 37.1 °C (98.8 °F) (Temporal)   Resp 16   Ht 1.778 m (5' 10\")   Wt 108.5 kg (239 lb 3.2 oz)   SpO2 98%   BMI 34.32 kg/m²   General: Alert, pleasant, NAD  HEENT: Normocephalic.  EOMI, no icterus or pallor.  Conjunctivae and lids normal. External ears normal. Oropharynx non-erythematous, mucous membranes moist.  Neck supple.  No thyromegaly or masses palpated.   Lymph: No cervical or supraclavicular lymphadenopathy.  Cardiovascular: Regular rate and rhythm.   Respiratory: Normal respiratory effort.  Clear to auscultation bilaterally.  Abdomen: Non-distended, soft  Skin: Warm, dry, no rashes.  Musculoskeletal: Gait is normal.  Moves all extremities well.  Extremities: No leg edema.  Psych:  Affect/mood is normal, judgement is good, memory is intact, grooming is appropriate.      Labs (1/6/21) were reviewed and discussed with patients.  All questions were answered.      Assessment and Plan    1. Controlled type 2 diabetes mellitus without complication, without long-term current use of insulin (HCC)  - atorvastatin (LIPITOR) 10 MG Tab; Take 1 Tab by mouth every day.  Dispense: 90 Tab; Refill: 1  - metFORMIN (GLUCOPHAGE) 850 MG Tab; Take 1 Tab by mouth 2 times a day, with meals.  Dispense: 180 Tab; Refill: 3  - On Trulicity  - Alcohol Swabs; Wipe site with prep pad prior to injection.  Dispense: 100 Each; Refill: 2  - Lipid Profile; Future  - Blood Glucose Test Strips; Use one One " Touch Verio strip to test blood sugar once daily early morning before first meal.  Dispense: 100 Each; Refill: 3  - Lancets; Use one One Touch Verio lancet to test blood sugar once daily early morning before first meal.  Dispense: 100 Each; Refill: 0  Follow with endocrinology  Recommend patient to schedule for diabetic eye exam with ophthalmology contact info given to patient    2. Dyslipidemia  - atorvastatin (LIPITOR) 10 MG Tab; Take 1 Tab by mouth every day.  Dispense: 90 Tab; Refill: 1  - Lipid Profile; Future  - TSH WITH REFLEX TO FT4; Future    3. Essential hypertension  - amLODIPine (NORVASC) 5 MG Tab; Take 1 Tab by mouth every day.  Dispense: 30 Tab; Refill: 5  - CBC WITH DIFFERENTIAL; Future  - Comp Metabolic Panel; Future  - TSH WITH REFLEX TO FT4; Future    4. Vitamin D deficiency  - vitamin D, Ergocalciferol, (DRISDOL) 1.25 MG (47287 UT) Cap capsule; Take 1 Cap by mouth every 7 days.  Dispense: 12 Cap; Refill: 0  - VITAMIN D,25 HYDROXY; Future  Recommend Vitamin D3 50,000 international units weekly for 12 weeks, then vitamin D3 9588-2532 international units daily    5. Elevated alkaline phosphatase level  - GAMMA GT (GGT); Future  - ALKALINE PHOSPHATASE ISOENZYMES; Future    6. Class 1 obesity without serious comorbidity with body mass index (BMI) of 33.0 to 33.9 in adult, unspecified obesity type  Lifestyle modifications        Follow-up:Return in about 6 months (around 7/14/2021), or if symptoms worsen or fail to improve.    This note was created using voice recognition software. There may be unintended errors in spelling, grammar or content.

## 2021-01-14 NOTE — ASSESSMENT & PLAN NOTE
Ref. Range 8/3/2020 09:38 8/10/2020 15:27 8/22/2020 09:49 1/6/2021 10:41 1/6/2021 10:42   Alkaline Phosphatase Latest Ref Range: 30 - 99 U/L 100 (H)    106 (H)

## 2021-01-14 NOTE — ASSESSMENT & PLAN NOTE
Type 2, diagnosed on 5/29/2020.   AYAH and insulin Ab are both negative.  Following with endocrinology    Diabetic diet, exercise  metformin 850 BID   Was on Ozempic, however, due to insurance switching to trulicity.  Trulicity 0.75 every 7 days     Ref. Range 5/29/2020 15:17   Glycohemoglobin Latest Ref Range: 0.0 - 5.6 % 10.2 (H)   Estim. Avg Glu Latest Units: mg/dL 246      Ref. Range 8/22/2020 09:49   Glycohemoglobin Latest Ref Range: 0.0 - 5.6 % 6.3 (H)   Estim. Avg Glu Latest Units: mg/dL 134      Ref. Range 1/6/2021 10:42   Glycohemoglobin Latest Ref Range: 0.0 - 5.6 % 5.8 (H)   Estim. Avg Glu Latest Units: mg/dL 120     Monofilament intact on 6/12/2020  Feet: tinea pedis, topical antifungal

## 2021-01-14 NOTE — ASSESSMENT & PLAN NOTE
DASH diet  Stable on amlodipine   Monitor BP with upper arm cuff  Denies any chest pain, shortness of breath, leg swelling, lightheadedness, dizziness.

## 2021-01-14 NOTE — ASSESSMENT & PLAN NOTE
Ref. Range 8/3/2020 09:38   25-Hydroxy   Vitamin D 25 Latest Ref Range: 30 - 100 ng/mL 19 (L)      Ref. Range 1/6/2021 10:42   25-Hydroxy   Vitamin D 25 Latest Ref Range: 30 - 100 ng/mL 17 (L)     Recommend Vitamin D3 50,000 international units weekly for 12 weeks, then vitamin D3 0845-7936 international units daily

## 2021-07-12 ENCOUNTER — OFFICE VISIT (OUTPATIENT)
Dept: MEDICAL GROUP | Facility: MEDICAL CENTER | Age: 46
End: 2021-07-12
Payer: COMMERCIAL

## 2021-07-12 VITALS
DIASTOLIC BLOOD PRESSURE: 74 MMHG | HEIGHT: 69 IN | HEART RATE: 76 BPM | RESPIRATION RATE: 12 BRPM | TEMPERATURE: 97.6 F | BODY MASS INDEX: 35.55 KG/M2 | SYSTOLIC BLOOD PRESSURE: 128 MMHG | WEIGHT: 240 LBS | OXYGEN SATURATION: 96 %

## 2021-07-12 DIAGNOSIS — L60.8 TOENAIL DEFORMITY: ICD-10-CM

## 2021-07-12 DIAGNOSIS — E66.9 CLASS 1 OBESITY WITHOUT SERIOUS COMORBIDITY WITH BODY MASS INDEX (BMI) OF 33.0 TO 33.9 IN ADULT, UNSPECIFIED OBESITY TYPE: ICD-10-CM

## 2021-07-12 DIAGNOSIS — I10 ESSENTIAL HYPERTENSION: ICD-10-CM

## 2021-07-12 DIAGNOSIS — E55.9 VITAMIN D DEFICIENCY: ICD-10-CM

## 2021-07-12 DIAGNOSIS — E11.9 CONTROLLED TYPE 2 DIABETES MELLITUS WITHOUT COMPLICATION, WITHOUT LONG-TERM CURRENT USE OF INSULIN (HCC): ICD-10-CM

## 2021-07-12 DIAGNOSIS — B35.3 TINEA PEDIS OF BOTH FEET: ICD-10-CM

## 2021-07-12 PROCEDURE — 99214 OFFICE O/P EST MOD 30 MIN: CPT | Performed by: INTERNAL MEDICINE

## 2021-07-12 RX ORDER — AMLODIPINE BESYLATE 5 MG/1
5 TABLET ORAL
Qty: 90 TABLET | Refills: 3 | Status: SHIPPED | OUTPATIENT
Start: 2021-07-12 | End: 2022-01-18 | Stop reason: SDUPTHER

## 2021-07-12 RX ORDER — AMLODIPINE BESYLATE 5 MG/1
5 TABLET ORAL
Qty: 30 TABLET | Refills: 5 | Status: SHIPPED | OUTPATIENT
Start: 2021-07-12 | End: 2021-07-12 | Stop reason: SDUPTHER

## 2021-07-12 RX ORDER — VITAMIN B COMPLEX
2000 TABLET ORAL DAILY
COMMUNITY

## 2021-07-12 RX ORDER — KETOCONAZOLE 20 MG/G
1 CREAM TOPICAL DAILY
Qty: 3 G | Refills: 2 | Status: SHIPPED | OUTPATIENT
Start: 2021-07-12 | End: 2022-05-18 | Stop reason: SDUPTHER

## 2021-07-12 ASSESSMENT — FIBROSIS 4 INDEX: FIB4 SCORE: 1.12

## 2021-07-12 NOTE — ASSESSMENT & PLAN NOTE
Type 2, diagnosed on 5/29/2020.   AYAH and insulin Ab are both negative.  Following with endocrinology    Diabetic diet, exercise  metformin 850 BID   Trulicity 0.75 every 7 days     Ref. Range 1/6/2021 10:42   Glycohemoglobin Latest Ref Range: 0.0 - 5.6 % 5.8 (H)      Ref. Range 5/29/2020 15:17   Glycohemoglobin Latest Ref Range: 0.0 - 5.6 % 10.2 (H)   Estim. Avg Glu Latest Units: mg/dL 246      Ref. Range 8/22/2020 09:49   Glycohemoglobin Latest Ref Range: 0.0 - 5.6 % 6.3 (H)   Estim. Avg Glu Latest Units: mg/dL 134      Ref. Range 1/6/2021 10:42   Glycohemoglobin Latest Ref Range: 0.0 - 5.6 % 5.8 (H)   Estim. Avg Glu Latest Units: mg/dL 120     Monofilament intact on 7/12/21  Monofilament testing with a 10 gram force: sensation intact: intact bilaterally  Visual Inspection: Feet with scaly rash  Pedal pulses: intact bilaterally    Feet: tinea pedis, topical antifungal

## 2021-07-12 NOTE — PROGRESS NOTES
Established Patient    Arvind Jesus is a 45 y.o. male who presents today with the following:    CC:   Chief Complaint   Patient presents with   • Other     check up   • Medication Refill       HPI:     Vitamin D deficiency    Recommend Vitamin D3 50,000 international units weekly for 12 weeks, then vitamin D3 2000 international units daily        Tinea pedis of both feet  Scaly soles, cracks of feet  Improving with ketoconazole topical        Essential hypertension  DASH diet  Stable on amlodipine   Monitor BP with upper arm cuff  Denies any chest pain, shortness of breath, leg swelling, lightheadedness, dizziness.        Class 1 obesity with body mass index (BMI) of 33.0 to 33.9 in adult  Body mass index is 35.44 kg/m².  Healthful lifestyle measures          Controlled type 2 diabetes mellitus without complication, without long-term current use of insulin (HCC)  Type 2, diagnosed on 5/29/2020.   AYAH and insulin Ab are both negative.  Following with endocrinology    Diabetic diet, exercise  metformin 850 BID   Trulicity 0.75 every 7 days     Ref. Range 1/6/2021 10:42   Glycohemoglobin Latest Ref Range: 0.0 - 5.6 % 5.8 (H)      Ref. Range 5/29/2020 15:17   Glycohemoglobin Latest Ref Range: 0.0 - 5.6 % 10.2 (H)   Estim. Avg Glu Latest Units: mg/dL 246      Ref. Range 8/22/2020 09:49   Glycohemoglobin Latest Ref Range: 0.0 - 5.6 % 6.3 (H)   Estim. Avg Glu Latest Units: mg/dL 134      Ref. Range 1/6/2021 10:42   Glycohemoglobin Latest Ref Range: 0.0 - 5.6 % 5.8 (H)   Estim. Avg Glu Latest Units: mg/dL 120     Monofilament intact on 7/12/21  Monofilament testing with a 10 gram force: sensation intact: intact bilaterally  Visual Inspection: Feet with scaly rash  Pedal pulses: intact bilaterally    Feet: tinea pedis, topical antifungal          Current Outpatient Medications   Medication Sig Dispense Refill   • vitamin D (CHOLECALCIFEROL) 1000 Unit (25 mcg) Tab Take 2,000 Units by mouth every day.     •  ketoconazole (NIZORAL) 2 % Cream Apply 1 Application topically every day. 3 g 2   • amLODIPine (NORVASC) 5 MG Tab Take 1 tablet by mouth every day. 90 tablet 3   • metFORMIN (GLUCOPHAGE) 850 MG Tab Take 1 tablet by mouth 2 times a day with meals. 180 tablet 3   • atorvastatin (LIPITOR) 10 MG Tab Take 1 Tab by mouth every day. 90 Tab 1   • Alcohol Swabs Wipe site with prep pad prior to injection. 100 Each 2   • Blood Glucose Test Strips Use one One Touch Verio strip to test blood sugar once daily early morning before first meal. 100 Each 3   • Lancets Use one One Touch Verio lancet to test blood sugar once daily early morning before first meal. 100 Each 0   • Dulaglutide (TRULICITY) 0.75 MG/0.5ML Solution Pen-injector Inject 1 Each as instructed every 7 days. 1.96 mL 11   • ONETOUCH VERIO strip USE TO TEST BLOOD SUGAR ONCE A DAY IN EARLY MORNING BEFORE FIRST MEAL     • Blood Glucose Monitoring Suppl (ONETOUCH VERIO) w/Device Kit USE TO TEST BLOOD SUGAR ONCE A DAY IN EARLY MORNING BEFORE FIRST MEAL     • Blood Glucose Meter Kit Test blood sugar as recommended by provider.Insurance preferred blood glucose monitoring kit. 1 Kit 0     No current facility-administered medications for this visit.       Allergies, past medical history, past surgical history, medications, family history, social history reviewed and updated.    ROS   Constitutional: Denies fevers or chills  Eyes: Denies changes in vision  Ears/Nose/Throat/Mouth: Denies nasal congestion or sore throat   Cardiovascular: Denies chest pain or palpitations   Respiratory: Denies shortness of breath , Denies cough  Gastrointestinal/Hepatic: Denies abd pain, nausea, vomiting   Genitourinary: Denies dysuria or frequency  Musculoskeletal/Rheum: Denies joint pain and swelling   Neurological: Denies headache  Psychiatric: Denies mood disorder   Endocrine: Diabetes Denies thyroid dysfunction  Heme/Oncology/Lymph Nodes: Denies weight changes or enlarged LNs.    Physical  "Exam  Vitals: /74 (BP Location: Left arm, Patient Position: Sitting, BP Cuff Size: Large adult)   Pulse 76   Temp 36.4 °C (97.6 °F)   Resp 12   Ht 1.753 m (5' 9\")   Wt 109 kg (240 lb)   SpO2 96%   BMI 35.44 kg/m²   General: Alert, pleasant, NAD  HEENT: Normocephalic.  EOMI, no icterus or pallor.  Conjunctivae and lids normal. External ears normal. Wearing a mask. Oropharynx non-erythematous, mucous membranes moist.  Neck supple.  No thyromegaly or masses palpated.   Lymph: No cervical or supraclavicular lymphadenopathy.  Cardiovascular: Regular rate and rhythm.  S1 and S2 normal.  No murmurs appreciated.  Respiratory: Normal respiratory effort.  Clear to auscultation bilaterally.  Abdomen: Non-distended, soft, non-tender  Skin: Warm, dry, no rashes.  Musculoskeletal: Gait is normal.  Moves all extremities well.  Extremities: No leg edema.  Radial pulses 2+ symmetric.   Psych:  Affect/mood is normal, judgement is good, memory is intact, grooming is appropriate.    Assessment and Plan    1. Controlled type 2 diabetes mellitus without complication, without long-term current use of insulin (HCC)  - HEMOGLOBIN A1C; Future  - metFORMIN (GLUCOPHAGE) 850 MG Tab; Take 1 tablet by mouth 2 times a day with meals.  Dispense: 180 tablet; Refill: 3  On victoza  Diabetic diet  Follow up with endocrinology    2. Toenail deformity  - REFERRAL TO PODIATRY    3. Tinea pedis of both feet  - ketoconazole (NIZORAL) 2 % Cream; Apply 1 Application topically every day.  Dispense: 3 g; Refill: 2    4. Essential hypertension  - amLODIPine (NORVASC) 5 MG Tab; Take 1 tablet by mouth every day.  Dispense: 90 tablet; Refill: 3    5. Vitamin D deficiency  Recommend prescription Vitamin D3 50,000 international units weekly for 12 weeks (sent to pharmacy), then over the counter vitamin D3 2000 international units daily    6. Class 1 obesity without serious comorbidity with body mass index (BMI) of 33.0 to 33.9 in adult, unspecified " obesity type  Healthful lifestyle measures        Follow-up:Return in about 6 months (around 1/12/2022), or if symptoms worsen or fail to improve.    This note was created using voice recognition software. There may be unintended errors in spelling, grammar or content.

## 2021-07-12 NOTE — ASSESSMENT & PLAN NOTE
Recommend Vitamin D3 50,000 international units weekly for 12 weeks, then vitamin D3 2000 international units daily

## 2021-07-16 LAB
ALBUMIN SERPL-MCNC: 4.6 G/DL (ref 4–5)
ALBUMIN/GLOB SERPL: 1.8 {RATIO} (ref 1.2–2.2)
ALP BONE CFR SERPL: 45 % (ref 12–68)
ALP INTEST CFR SERPL: 35 % (ref 0–18)
ALP LIVER CFR SERPL: 20 % (ref 13–88)
ALP SERPL-CCNC: 121 IU/L (ref 48–121)
ALT SERPL-CCNC: 31 IU/L (ref 0–44)
AST SERPL-CCNC: 20 IU/L (ref 0–40)
BASOPHILS # BLD AUTO: 0 X10E3/UL (ref 0–0.2)
BASOPHILS NFR BLD AUTO: 0 %
BILIRUB SERPL-MCNC: 0.9 MG/DL (ref 0–1.2)
BUN SERPL-MCNC: 10 MG/DL (ref 6–24)
BUN/CREAT SERPL: 14 (ref 9–20)
CALCIUM SERPL-MCNC: 8.7 MG/DL (ref 8.7–10.2)
CHLORIDE SERPL-SCNC: 106 MMOL/L (ref 96–106)
CHOLEST SERPL-MCNC: 114 MG/DL (ref 100–199)
CO2 SERPL-SCNC: 25 MMOL/L (ref 20–29)
CREAT SERPL-MCNC: 0.74 MG/DL (ref 0.76–1.27)
EOSINOPHIL # BLD AUTO: 0.1 X10E3/UL (ref 0–0.4)
EOSINOPHIL NFR BLD AUTO: 1 %
ERYTHROCYTE [DISTWIDTH] IN BLOOD BY AUTOMATED COUNT: 13.2 % (ref 11.6–15.4)
GGT SERPL-CCNC: 23 IU/L (ref 0–65)
GLOBULIN SER CALC-MCNC: 2.5 G/DL (ref 1.5–4.5)
GLUCOSE SERPL-MCNC: 115 MG/DL (ref 65–99)
HBA1C MFR BLD: 6.1 % (ref 4.8–5.6)
HCT VFR BLD AUTO: 45 % (ref 37.5–51)
HDLC SERPL-MCNC: 32 MG/DL
HGB BLD-MCNC: 15.3 G/DL (ref 13–17.7)
IMM GRANULOCYTES # BLD AUTO: 0 X10E3/UL (ref 0–0.1)
IMM GRANULOCYTES NFR BLD AUTO: 0 %
IMMATURE CELLS  115398: NORMAL
LABORATORY COMMENT REPORT: ABNORMAL
LDLC SERPL CALC-MCNC: 70 MG/DL (ref 0–99)
LYMPHOCYTES # BLD AUTO: 3.1 X10E3/UL (ref 0.7–3.1)
LYMPHOCYTES NFR BLD AUTO: 53 %
MCH RBC QN AUTO: 31.5 PG (ref 26.6–33)
MCHC RBC AUTO-ENTMCNC: 34 G/DL (ref 31.5–35.7)
MCV RBC AUTO: 93 FL (ref 79–97)
MONOCYTES # BLD AUTO: 0.5 X10E3/UL (ref 0.1–0.9)
MONOCYTES NFR BLD AUTO: 8 %
MORPHOLOGY BLD-IMP: NORMAL
NEUTROPHILS # BLD AUTO: 2.3 X10E3/UL (ref 1.4–7)
NEUTROPHILS NFR BLD AUTO: 38 %
NRBC BLD AUTO-RTO: NORMAL %
PLATELET # BLD AUTO: 217 X10E3/UL (ref 150–450)
POTASSIUM SERPL-SCNC: 3.9 MMOL/L (ref 3.5–5.2)
PROT SERPL-MCNC: 7.1 G/DL (ref 6–8.5)
RBC # BLD AUTO: 4.85 X10E6/UL (ref 4.14–5.8)
SODIUM SERPL-SCNC: 143 MMOL/L (ref 134–144)
TRIGL SERPL-MCNC: 55 MG/DL (ref 0–149)
TSH SERPL DL<=0.005 MIU/L-ACNC: 1.41 UIU/ML (ref 0.45–4.5)
VLDLC SERPL CALC-MCNC: 12 MG/DL (ref 5–40)
WBC # BLD AUTO: 5.9 X10E3/UL (ref 3.4–10.8)

## 2021-08-13 ENCOUNTER — OFFICE VISIT (OUTPATIENT)
Dept: ENDOCRINOLOGY | Facility: MEDICAL CENTER | Age: 46
End: 2021-08-13
Attending: INTERNAL MEDICINE
Payer: COMMERCIAL

## 2021-08-13 VITALS
BODY MASS INDEX: 35.43 KG/M2 | HEIGHT: 70 IN | HEART RATE: 80 BPM | SYSTOLIC BLOOD PRESSURE: 130 MMHG | OXYGEN SATURATION: 95 % | DIASTOLIC BLOOD PRESSURE: 72 MMHG | WEIGHT: 247.5 LBS

## 2021-08-13 DIAGNOSIS — E78.5 DYSLIPIDEMIA: ICD-10-CM

## 2021-08-13 DIAGNOSIS — Z79.84 LONG TERM (CURRENT) USE OF ORAL HYPOGLYCEMIC DRUGS: ICD-10-CM

## 2021-08-13 DIAGNOSIS — E11.9 CONTROLLED TYPE 2 DIABETES MELLITUS WITHOUT COMPLICATION, WITHOUT LONG-TERM CURRENT USE OF INSULIN (HCC): ICD-10-CM

## 2021-08-13 DIAGNOSIS — E55.9 VITAMIN D DEFICIENCY: ICD-10-CM

## 2021-08-13 PROCEDURE — 99214 OFFICE O/P EST MOD 30 MIN: CPT | Performed by: INTERNAL MEDICINE

## 2021-08-13 PROCEDURE — 99212 OFFICE O/P EST SF 10 MIN: CPT | Performed by: INTERNAL MEDICINE

## 2021-08-13 RX ORDER — DULAGLUTIDE 1.5 MG/.5ML
0.5 INJECTION, SOLUTION SUBCUTANEOUS
Qty: 6 ML | Refills: 2 | Status: SHIPPED | OUTPATIENT
Start: 2021-08-13 | End: 2022-01-18 | Stop reason: SDUPTHER

## 2021-08-13 ASSESSMENT — FIBROSIS 4 INDEX: FIB4 SCORE: 0.78

## 2021-08-13 NOTE — PROGRESS NOTES
CHIEF COMPLAINT: Patient is here for follow up of Type 2 Diabetes Mellitus    HPI:     Arvind Jesus is a 45 y.o. male with Type 2 Diabetes Mellitus here for follow up.    Labs from July 13, 2021 show A1c is 6.1%  Labs from 8/22/2020 HbA1c was 6.3%      Patient has been lost to follow-up.  He was last seen last year.  He is accompanied by his daughter today.  Previously he was on Ozempic which was not covered by his insurance    On follow-up he is now on Metformin 850 mg twice a day and Trulicity 0.75 mg weekly.    He reports difficulty with losing weight despite being on a GLP-1 analog but we discussed that he is on the lowest dose.  We were supposed to increase his dose on follow-up but I did not see him again until today        He has hyperlipidemia and is on atorvastatin 10 mg daily  He denies history of coronary artery disease.  He denies muscle aches and muscle weakness  LDL cholesterol was 70 on July 2021      He does not have albuminuria  U ACR was less than 30 on January 2021      He has an upcoming appointment with his ophthalmologist Dr. Grady      BG Diary:  Patient brought blood sugar log showing blood sugars range from     Weight has increased 5-10 pounds over last 12 months    Diabetes Complications   Retinopathy: No known retinopathy.  Last eye exam: Patient reports an upcoming eye exam appointment but he does not recall the details  Neuropathy: Denies paresthesias or numbness in hands or feet. Denies any foot wounds.  Exercise: Minimal.  Diet: Fair.  Patient's medications, allergies, and social histories were reviewed and updated as appropriate.    ROS:     CONS:     No fever, no chills   EYES:     No diplopia, no blurry vision   CV:           No chest pain, no palpitations   PULM:     No SOB, no cough, no hemoptysis.   GI:            No nausea, no vomiting, no diarrhea, no constipation   ENDO:     No polyuria, no polydipsia, no heat intolerance, no cold intolerance       Past  "Medical History:  Problem List:  2021: Elevated alkaline phosphatase level  2020: Long term (current) use of oral hypoglycemic drugs  2020: Vitamin D deficiency  2020: Hypokalemia  2020: Dyslipidemia  2020: Controlled type 2 diabetes mellitus without complication,   without long-term current use of insulin ([facility])  2020: Class 1 obesity with body mass index (BMI) of 33.0 to 33.9   in adult  2020: Essential hypertension  2020: Tinea pedis of both feet      Past Surgical History:  History reviewed. No pertinent surgical history.     Allergies:  Patient has no known allergies.     Social History:  Social History     Tobacco Use   • Smoking status: Former Smoker     Packs/day: 0.50     Years: 10.00     Pack years: 5.00     Quit date:      Years since quittin.6   • Smokeless tobacco: Never Used   Vaping Use   • Vaping Use: Never used   Substance Use Topics   • Alcohol use: Not Currently   • Drug use: Never        Family History:   family history includes Diabetes in his brother and brother; Hypertension in his brother, brother, and father.      PHYSICAL EXAM:   OBJECTIVE:  Vital signs: /72   Pulse 80   Ht 1.778 m (5' 10\")   Wt 112 kg (247 lb 8 oz)   SpO2 95%   BMI 35.51 kg/m²   GENERAL: Well-developed, well-nourished in no apparent distress.   EYE:  No ocular asymmetry, PERRLA  HENT: Pink, moist mucous membranes.    NECK: No thyromegaly.   CARDIOVASCULAR:  No murmurs  LUNGS: Clear breath sounds  ABDOMEN: Soft, nontender   EXTREMITIES: No clubbing, cyanosis, or edema.   NEUROLOGICAL: No gross focal motor abnormalities   LYMPH: No cervical adenopathy palpated.   SKIN: No rashes, lesions.       Labs:  Lab Results   Component Value Date/Time    HBA1C 6.1 (H) 2021 05:09 AM    HBA1C 5.8 (H) 2021 10:42 AM        Lab Results   Component Value Date/Time    WBC 5.9 2021 05:09 AM    WBC 8.1 2020 03:17 PM    RBC 4.85 2021 05:09 AM    RBC 4.66 (L) 2020 " 03:17 PM    HEMOGLOBIN 15.3 07/13/2021 05:09 AM    HEMOGLOBIN 15.0 05/29/2020 03:17 PM    MCV 93 07/13/2021 05:09 AM    MCV 91.6 05/29/2020 03:17 PM    MCH 31.5 07/13/2021 05:09 AM    MCH 32.2 05/29/2020 03:17 PM    MCHC 34.0 07/13/2021 05:09 AM    MCHC 35.1 05/29/2020 03:17 PM    RDW 13.2 07/13/2021 05:09 AM    RDW 40.8 05/29/2020 03:17 PM    MPV 10.6 05/29/2020 03:17 PM       Lab Results   Component Value Date/Time    SODIUM 143 07/13/2021 05:09 AM    SODIUM 139 01/06/2021 10:42 AM    POTASSIUM 3.9 07/13/2021 05:09 AM    POTASSIUM 3.7 01/06/2021 10:42 AM    CHLORIDE 106 07/13/2021 05:09 AM    CHLORIDE 103 01/06/2021 10:42 AM    CO2 25 07/13/2021 05:09 AM    CO2 26 01/06/2021 10:42 AM    ANION 10.0 01/06/2021 10:42 AM    GLUCOSE 115 (H) 07/13/2021 05:09 AM    GLUCOSE 98 01/06/2021 10:42 AM    BUN 10 07/13/2021 05:09 AM    BUN 10 01/06/2021 10:42 AM    CREATININE 0.74 (L) 07/13/2021 05:09 AM    CREATININE 0.71 01/06/2021 10:42 AM    CALCIUM 8.7 07/13/2021 05:09 AM    CALCIUM 8.8 01/06/2021 10:42 AM    ASTSGOT 20 07/13/2021 05:09 AM    ASTSGOT 22 01/06/2021 10:42 AM    ALTSGPT 31 07/13/2021 05:09 AM    ALTSGPT 24 01/06/2021 10:42 AM    TBILIRUBIN 0.9 07/13/2021 05:09 AM    TBILIRUBIN 0.7 01/06/2021 10:42 AM    ALBUMIN 4.6 07/13/2021 05:09 AM    ALBUMIN 4.8 01/06/2021 10:42 AM    TOTPROTEIN 7.1 07/13/2021 05:09 AM    TOTPROTEIN 7.4 01/06/2021 10:42 AM    GLOBULIN 2.5 07/13/2021 05:09 AM    GLOBULIN 2.6 01/06/2021 10:42 AM    AGRATIO 1.8 07/13/2021 05:09 AM    AGRATIO 1.8 01/06/2021 10:42 AM       Lab Results   Component Value Date/Time    CHOLSTRLTOT 134 06/27/2020 0935    TRIGLYCERIDE 58 06/27/2020 0935    HDL 32 (A) 06/27/2020 0935    LDL 90 06/27/2020 0935       Lab Results   Component Value Date/Time    MALBCRT see below 01/06/2021 10:41 AM    MICROALBUR <1.2 01/06/2021 10:41 AM        Lab Results   Component Value Date/Time    TSHULTRASEN 0.872 05/29/2020 1517     No results found for: FREEDIR  No results found  for: FREET3  No results found for: THYSTIMIG        ASSESSMENT/PLAN:     1. Controlled type 2 diabetes mellitus without complication, without long-term current use of insulin (HCC)  Well-controlled  Continue metformin   Adjust Trulicity to 1.5 mg weekly to help with weight management and satiety  We have room to go up on this medication if necessary to 3 mg and even 4.5 mg weekly if needed  I recommend that he continue to watch his carb intake  I recommend that he start exercising regularly  Recommend that he get us copies of his eye exams  Follow-up in 3 months      2. Dyslipidemia  Controlled  Continue atorvastatin  Repeat lipids next year    3. Long term (current) use of oral hypoglycemic drugs  Patient is on oral agents and a GLP-1 for type 2 diabetes management      Return in about 3 months (around 11/13/2021).      Thank you kindly for allowing me to participate in the diabetes care plan for this patient.    Archie Em MD, FACE, [facility]  10/06/20    CC:   Jayy Franco M.D.

## 2021-08-13 NOTE — PROGRESS NOTES
RN-CDE Note    Subjective:   Endocrinology Clinic Progress Note  PCP: Jayy Franco M.D.    HPI:  Arvind Jesus is a 46 y.o. old patient who is seen today for review of Type 2 Diabetes.  Recent changes in health: Health good  DM:   Last A1c:   Lab Results   Component Value Date/Time    HBA1C 6.1 (H) 07/13/2021 05:09 AM    HBA1C 5.8 (H) 01/06/2021 10:42 AM      Previous A1c was 5.8 on 1/6/21  A1C GOAL: < 7    Diabetes Medications:   Metformin 850 mg BID  Trulicity .75 mg weekly    Taking daily ASA: No    Exercise: no regular exercise, sedentary  Diet: Twice daily and eating lots of fruit  Patient's body mass index is 35.51 kg/m². Exercise and nutrition counseling were performed at this visit.    Glucose monitoring frequency: Testing blood sugars every other day in the morning.  109-120's  Hypoglycemic episodes: no  Last Retinal Exam: has an appointment with Dr. Garcia  Daily Foot Exam: Yes   Foot Exam:  Monofilament: done  Monofilament testing with a 10 gram force: sensation intact: intact bilaterally  Visual Inspection: Feet without maceration, ulcers, fissures.  Pedal pulses: intact bilaterally   Lab Results   Component Value Date/Time    MALBCRT see below 01/06/2021 10:41 AM    MICROALBUR <1.2 01/06/2021 10:41 AM      ACR Albumin/Creatinine Ratio goal <30   Currently Rx ACE/ARB: No   Dyslipidemia:  Lab Results   Component Value Date/Time    CHOLSTRLTOT 114 07/13/2021 05:09 AM    CHOLSTRLTOT 115 01/06/2021 10:42 AM    LDL 61 01/06/2021 10:42 AM    HDL 32 (L) 07/13/2021 05:09 AM    HDL 39 (A) 01/06/2021 10:42 AM    TRIGLYCERIDE 55 07/13/2021 05:09 AM    TRIGLYCERIDE 73 01/06/2021 10:42 AM       Currently Rx Statin: Yes     He  reports that he quit smoking about 6 years ago. He has a 5.00 pack-year smoking history. He has never used smokeless tobacco.      Plan:     Discussed and educated on:   - All medications, side effects and compliance (discussed carefully)  - Annual eye examinations at  Ophthalmology  - Home glucose monitoring emphasized  - Weight control and daily exercise    Recommended medication changes: Consider going up on the Trulicity.

## 2021-09-21 DIAGNOSIS — E11.9 CONTROLLED TYPE 2 DIABETES MELLITUS WITHOUT COMPLICATION, WITHOUT LONG-TERM CURRENT USE OF INSULIN (HCC): ICD-10-CM

## 2021-09-21 DIAGNOSIS — E78.5 DYSLIPIDEMIA: ICD-10-CM

## 2021-09-21 RX ORDER — LANCETS 33 GAUGE
EACH MISCELLANEOUS
Qty: 100 EACH | Refills: 2 | Status: SHIPPED | OUTPATIENT
Start: 2021-09-21 | End: 2023-02-23

## 2021-09-21 RX ORDER — ATORVASTATIN CALCIUM 10 MG/1
TABLET, FILM COATED ORAL
Qty: 90 TABLET | Refills: 1 | Status: SHIPPED | OUTPATIENT
Start: 2021-09-21 | End: 2022-01-18 | Stop reason: SDUPTHER

## 2021-11-09 DIAGNOSIS — E78.5 DYSLIPIDEMIA: ICD-10-CM

## 2021-11-09 DIAGNOSIS — Z79.84 LONG TERM (CURRENT) USE OF ORAL HYPOGLYCEMIC DRUGS: ICD-10-CM

## 2021-11-09 DIAGNOSIS — E11.9 CONTROLLED TYPE 2 DIABETES MELLITUS WITHOUT COMPLICATION, WITHOUT LONG-TERM CURRENT USE OF INSULIN (HCC): ICD-10-CM

## 2021-11-09 DIAGNOSIS — E55.9 VITAMIN D DEFICIENCY: ICD-10-CM

## 2021-11-09 LAB
25(OH)D3+25(OH)D2 SERPL-MCNC: 34.6 NG/ML (ref 30–100)
ALBUMIN SERPL-MCNC: 4.5 G/DL (ref 4–5)
ALBUMIN/GLOB SERPL: 1.8 {RATIO} (ref 1.2–2.2)
ALP SERPL-CCNC: 123 IU/L (ref 44–121)
ALT SERPL-CCNC: 31 IU/L (ref 0–44)
AST SERPL-CCNC: 23 IU/L (ref 0–40)
BILIRUB SERPL-MCNC: 0.8 MG/DL (ref 0–1.2)
BUN SERPL-MCNC: 10 MG/DL (ref 6–24)
BUN/CREAT SERPL: 12 (ref 9–20)
CALCIUM SERPL-MCNC: 8.9 MG/DL (ref 8.7–10.2)
CHLORIDE SERPL-SCNC: 105 MMOL/L (ref 96–106)
CO2 SERPL-SCNC: 26 MMOL/L (ref 20–29)
CREAT SERPL-MCNC: 0.86 MG/DL (ref 0.76–1.27)
GLOBULIN SER CALC-MCNC: 2.5 G/DL (ref 1.5–4.5)
GLUCOSE SERPL-MCNC: 109 MG/DL (ref 65–99)
POTASSIUM SERPL-SCNC: 3.9 MMOL/L (ref 3.5–5.2)
PROT SERPL-MCNC: 7 G/DL (ref 6–8.5)
SODIUM SERPL-SCNC: 142 MMOL/L (ref 134–144)

## 2021-11-16 ENCOUNTER — NON-PROVIDER VISIT (OUTPATIENT)
Dept: ENDOCRINOLOGY | Facility: MEDICAL CENTER | Age: 46
End: 2021-11-16
Attending: INTERNAL MEDICINE
Payer: COMMERCIAL

## 2021-11-16 DIAGNOSIS — E11.9 CONTROLLED TYPE 2 DIABETES MELLITUS WITHOUT COMPLICATION, WITHOUT LONG-TERM CURRENT USE OF INSULIN (HCC): ICD-10-CM

## 2021-11-16 LAB
HBA1C MFR BLD: 5.5 % (ref 0–5.6)
INT CON NEG: NORMAL
INT CON POS: NORMAL

## 2021-11-16 PROCEDURE — 99212 OFFICE O/P EST SF 10 MIN: CPT | Performed by: INTERNAL MEDICINE

## 2021-11-16 PROCEDURE — 83036 HEMOGLOBIN GLYCOSYLATED A1C: CPT

## 2021-11-16 NOTE — PROGRESS NOTES
RN-CDE Note    Subjective:   Endocrinology Clinic Progress Note  PCP: Jayy Franco M.D.    HPI:  Arvind Jesus is a 46 y.o. old patient who is seen today for review of Type 2 Diabetes.  Recent changes in health: Health good.  DM:   Last A1c:   Lab Results   Component Value Date/Time    HBA1C 5.5 11/16/2021 02:21 PM      Previous A1c was 6.1 on 7/13/21  A1C GOAL: < 7    Diabetes Medications:   Trulicity 1.5 mg weekly  Metformin 850 mg daily    Exercise: Walking and painting  Diet: 2 sandwichs for lunch and protein,beans, and vegetables for dinner  Patient's body mass index is unknown because there is no height or weight on file. Exercise and nutrition counseling were performed at this visit.    Glucose monitoring frequency: Testing randomly  100's  Hypoglycemic episodes: no, he thought he was going low so decreased his Metformin to once daily.  Last Retinal Exam: on file and up-to-date  Daily Foot Exam: Yes   Foot Exam:  Monofilament: done  Monofilament testing with a 10 gram force: sensation intact: intact bilaterally  Visual Inspection: Feet without maceration, ulcers, fissures.  Pedal pulses: intact bilaterally   Lab Results   Component Value Date/Time    MALBCRT see below 01/06/2021 10:41 AM    MICROALBUR <1.2 01/06/2021 10:41 AM     He  reports that he quit smoking about 6 years ago. He has a 5.00 pack-year smoking history. He has never used smokeless tobacco.      Plan:     Discussed and educated on:   - All medications, side effects and compliance (discussed carefully)  - Annual eye examinations at Ophthalmology  - Home glucose monitoring emphasized  - Weight control and daily exercise    Recommended medication changes: He will continue taking his Trulicity 1.5 mg weekly and Metformin 850 mg daily.  Follow up 6 months with Dr. Em.

## 2022-01-18 ENCOUNTER — OFFICE VISIT (OUTPATIENT)
Dept: MEDICAL GROUP | Facility: MEDICAL CENTER | Age: 47
End: 2022-01-18
Payer: COMMERCIAL

## 2022-01-18 VITALS
RESPIRATION RATE: 14 BRPM | WEIGHT: 242.51 LBS | HEIGHT: 69 IN | DIASTOLIC BLOOD PRESSURE: 92 MMHG | BODY MASS INDEX: 35.92 KG/M2 | SYSTOLIC BLOOD PRESSURE: 140 MMHG | HEART RATE: 82 BPM | OXYGEN SATURATION: 97 % | TEMPERATURE: 97.8 F

## 2022-01-18 DIAGNOSIS — E11.9 CONTROLLED TYPE 2 DIABETES MELLITUS WITHOUT COMPLICATION, WITHOUT LONG-TERM CURRENT USE OF INSULIN (HCC): ICD-10-CM

## 2022-01-18 DIAGNOSIS — E55.9 VITAMIN D DEFICIENCY: ICD-10-CM

## 2022-01-18 DIAGNOSIS — E66.9 CLASS 1 OBESITY WITHOUT SERIOUS COMORBIDITY WITH BODY MASS INDEX (BMI) OF 33.0 TO 33.9 IN ADULT, UNSPECIFIED OBESITY TYPE: ICD-10-CM

## 2022-01-18 DIAGNOSIS — E78.5 DYSLIPIDEMIA: ICD-10-CM

## 2022-01-18 DIAGNOSIS — I10 ESSENTIAL HYPERTENSION: ICD-10-CM

## 2022-01-18 PROCEDURE — 99214 OFFICE O/P EST MOD 30 MIN: CPT | Performed by: INTERNAL MEDICINE

## 2022-01-18 RX ORDER — BLOOD SUGAR DIAGNOSTIC
STRIP MISCELLANEOUS
Qty: 100 STRIP | Refills: 3 | Status: SHIPPED | OUTPATIENT
Start: 2022-01-18 | End: 2022-05-18

## 2022-01-18 RX ORDER — ATORVASTATIN CALCIUM 10 MG/1
10 TABLET, FILM COATED ORAL
Qty: 90 TABLET | Refills: 3 | Status: SHIPPED | OUTPATIENT
Start: 2022-01-18 | End: 2023-02-28 | Stop reason: SDUPTHER

## 2022-01-18 RX ORDER — DULAGLUTIDE 1.5 MG/.5ML
0.5 INJECTION, SOLUTION SUBCUTANEOUS
Qty: 6 ML | Refills: 2 | Status: SHIPPED | OUTPATIENT
Start: 2022-01-18 | End: 2022-10-14 | Stop reason: SDUPTHER

## 2022-01-18 RX ORDER — AMLODIPINE BESYLATE 5 MG/1
5 TABLET ORAL
Qty: 90 TABLET | Refills: 3 | Status: SHIPPED | OUTPATIENT
Start: 2022-01-18 | End: 2023-04-11 | Stop reason: SDUPTHER

## 2022-01-18 ASSESSMENT — PATIENT HEALTH QUESTIONNAIRE - PHQ9: CLINICAL INTERPRETATION OF PHQ2 SCORE: 0

## 2022-01-18 ASSESSMENT — FIBROSIS 4 INDEX: FIB4 SCORE: 0.89

## 2022-01-19 NOTE — ASSESSMENT & PLAN NOTE
Type 2, diagnosed on 5/29/2020.   AYAH and insulin Ab are both negative.  Following with endocrinology    Diabetic diet, exercise  metformin 850 daily   Trulicity 0.5 ml every 7 days     Ref. Range 11/16/2021 14:21   Glycohemoglobin Latest Ref Range: 0.0 - 5.6 % 5.5      Ref. Range 1/6/2021 10:42   Glycohemoglobin Latest Ref Range: 0.0 - 5.6 % 5.8 (H)      Ref. Range 5/29/2020 15:17   Glycohemoglobin Latest Ref Range: 0.0 - 5.6 % 10.2 (H)   Estim. Avg Glu Latest Units: mg/dL 246      Ref. Range 8/22/2020 09:49   Glycohemoglobin Latest Ref Range: 0.0 - 5.6 % 6.3 (H)   Estim. Avg Glu Latest Units: mg/dL 134      Ref. Range 1/6/2021 10:42   Glycohemoglobin Latest Ref Range: 0.0 - 5.6 % 5.8 (H)   Estim. Avg Glu Latest Units: mg/dL 120     Monofilament intact on 7/12/21  Monofilament testing with a 10 gram force: sensation intact: intact bilaterally  Visual Inspection: Feet with scaly rash  Pedal pulses: intact bilaterally    Feet: tinea pedis, topical antifungal

## 2022-01-19 NOTE — PROGRESS NOTES
Established Patient    Arvind Jesus is a 46 y.o. male who presents today with the following:    CC:   Chief Complaint   Patient presents with   • Follow-Up       HPI:       Problem   Vitamin D Deficiency      completed Vitamin D3 50,000 international units weekly for 12 weeks  On vitamin D3 2000 international units daily       Dyslipidemia      On statin, tolerating well       Controlled Type 2 Diabetes Mellitus Without Complication, Without Long-Term Current Use of Insulin (Hcc)    Type 2, diagnosed on 5/29/2020.   AYAH and insulin Ab are both negative.  Following with endocrinology    Diabetic diet, exercise  metformin 850 daily   Trulicity 0.5 ml every 7 days     Ref. Range 11/16/2021 14:21   Glycohemoglobin Latest Ref Range: 0.0 - 5.6 % 5.5      Ref. Range 1/6/2021 10:42   Glycohemoglobin Latest Ref Range: 0.0 - 5.6 % 5.8 (H)      Ref. Range 5/29/2020 15:17   Glycohemoglobin Latest Ref Range: 0.0 - 5.6 % 10.2 (H)   Estim. Avg Glu Latest Units: mg/dL 246      Ref. Range 8/22/2020 09:49   Glycohemoglobin Latest Ref Range: 0.0 - 5.6 % 6.3 (H)   Estim. Avg Glu Latest Units: mg/dL 134      Ref. Range 1/6/2021 10:42   Glycohemoglobin Latest Ref Range: 0.0 - 5.6 % 5.8 (H)   Estim. Avg Glu Latest Units: mg/dL 120     Monofilament intact on 7/12/21  Monofilament testing with a 10 gram force: sensation intact: intact bilaterally  Visual Inspection: Feet with scaly rash  Pedal pulses: intact bilaterally    Feet: tinea pedis, topical antifungal       Class 1 Obesity With Body Mass Index (Bmi) of 33.0 to 33.9 in Adult    Body mass index is 35.81 kg/m².  Healthful lifestyle measures         Essential Hypertension    DASH diet  Stable on amlodipine   Monitor BP with upper arm cuff  Denies any chest pain, shortness of breath, leg swelling, lightheadedness, dizziness.            Current Outpatient Medications   Medication Sig Dispense Refill   • metFORMIN (GLUCOPHAGE) 850 MG Tab Take 1 Tablet by mouth every day. 90  Tablet 3   • amLODIPine (NORVASC) 5 MG Tab Take 1 Tablet by mouth every day. 90 Tablet 3   • atorvastatin (LIPITOR) 10 MG Tab Take 1 Tablet by mouth every day. 90 Tablet 3   • ONETOUCH VERIO strip USE TO TEST BLOOD SUGAR ONCE A DAY IN EARLY MORNING BEFORE FIRST MEAL 100 Strip 3   • Dulaglutide (TRULICITY) 1.5 MG/0.5ML Solution Pen-injector Inject 0.5 mL under the skin every 7 days. 4 pens per month 6 mL 2   • Lancets (ONETOUCH DELICA PLUS [id number]) Misc USE TO TEST BLOOD SUGAR ONCE A DAY IN EARLY MORNING BEFORE FIRST MEAL 100 Each 2   • vitamin D (CHOLECALCIFEROL) 1000 Unit (25 mcg) Tab Take 2,000 Units by mouth every day.     • ketoconazole (NIZORAL) 2 % Cream Apply 1 Application topically every day. 3 g 2   • Alcohol Swabs Wipe site with prep pad prior to injection. 100 Each 2   • Blood Glucose Test Strips Use one One Touch Verio strip to test blood sugar once daily early morning before first meal. 100 Each 3   • Blood Glucose Monitoring Suppl (ONETOUCH VERIO) w/Device Kit USE TO TEST BLOOD SUGAR ONCE A DAY IN EARLY MORNING BEFORE FIRST MEAL     • Blood Glucose Meter Kit Test blood sugar as recommended by provider.Insurance preferred blood glucose monitoring kit. 1 Kit 0     No current facility-administered medications for this visit.       Allergies, past medical history, past surgical history, medications, family history, social history reviewed and updated.    ROS   Constitutional: Denies fevers or chills  Eyes: Denies changes in vision  Ears/Nose/Throat/Mouth: Denies nasal congestion or sore throat   Cardiovascular: Denies chest pain or palpitations   Respiratory: Denies shortness of breath , Denies cough  Gastrointestinal/Hepatic: Denies abd pain, nausea, vomiting   Genitourinary: Denies dysuria or frequency  Musculoskeletal/Rheum: Denies joint pain and swelling   Neurological: Denies headache  Psychiatric: mood stable  Endocrine: diabetes Denies hx of thyroid dysfunction  Heme/Oncology/Lymph Nodes: Denies  "weight changes or enlarged LNs.    Physical Exam  Vitals: /92 (BP Location: Left arm, Patient Position: Sitting, BP Cuff Size: Adult)   Pulse 82   Temp 36.6 °C (97.8 °F) (Temporal)   Resp 14   Ht 1.753 m (5' 9\")   Wt 110 kg (242 lb 8.1 oz)   SpO2 97%   BMI 35.81 kg/m²   General: Alert, pleasant, NAD  HEENT: Normocephalic.  EOMI, no icterus or pallor.  Conjunctivae and lids normal. External ears normal. Wearing a mask. Oropharynx non-erythematous, mucous membranes moist.  Neck supple.  No thyromegaly or masses palpated.   Lymph: No cervical or supraclavicular lymphadenopathy.  Cardiovascular: Regular rate and rhythm.   Respiratory: Normal respiratory effort.  Clear to auscultation bilaterally.  Abdomen: Non-distended, soft, non-tender  Skin: Warm, dry  Musculoskeletal: Gait is normal.  Moves all extremities well.  Extremities: No leg edema.  Radial pulses 2+ symmetric.   Psych:  Affect/mood is normal, judgement is good, memory is intact, grooming is appropriate.      Assessment and Plan    1. Controlled type 2 diabetes mellitus without complication, without long-term current use of insulin (HCC)  - Comp Metabolic Panel; Future  - HEMOGLOBIN A1C; Future  - MICROALBUMIN CREAT RATIO URINE; Future  - metFORMIN (GLUCOPHAGE) 850 MG Tab; Take 1 Tablet by mouth every day.  Dispense: 90 Tablet; Refill: 3  - atorvastatin (LIPITOR) 10 MG Tab; Take 1 Tablet by mouth every day.  Dispense: 90 Tablet; Refill: 3  - ONETOUCH VERIO strip; USE TO TEST BLOOD SUGAR ONCE A DAY IN EARLY MORNING BEFORE FIRST MEAL  Dispense: 100 Strip; Refill: 3  - Dulaglutide (TRULICITY) 1.5 MG/0.5ML Solution Pen-injector; Inject 0.5 mL under the skin every 7 days. 4 pens per month  Dispense: 6 mL; Refill: 2  Follow with endocrinology    2. Dyslipidemia  - TSH WITH REFLEX TO FT4; Future  - Lipid Profile; Future  - atorvastatin (LIPITOR) 10 MG Tab; Take 1 Tablet by mouth every day.  Dispense: 90 Tablet; Refill: 3    3. Vitamin D deficiency  - " VITAMIN D,25 HYDROXY; Future  On Vit D 2000 units daily    4. Essential hypertension  - CBC WITH DIFFERENTIAL; Future  - Comp Metabolic Panel; Future  - TSH WITH REFLEX TO FT4; Future  - amLODIPine (NORVASC) 5 MG Tab; Take 1 Tablet by mouth every day.  Dispense: 90 Tablet; Refill: 3    5. Class 1 obesity without serious comorbidity with body mass index (BMI) of 33.0 to 33.9 in adult, unspecified obesity type  Healthful lifestyle measures        Follow-up:Return in about 4 months (around 5/18/2022), or if symptoms worsen or fail to improve.    This note was created using voice recognition software. There may be unintended errors in spelling, grammar or content.

## 2022-01-19 NOTE — ASSESSMENT & PLAN NOTE
completed Vitamin D3 50,000 international units weekly for 12 weeks  On vitamin D3 2000 international units daily

## 2022-05-12 LAB
25(OH)D3+25(OH)D2 SERPL-MCNC: 39.3 NG/ML (ref 30–100)
ALBUMIN SERPL-MCNC: 4.7 G/DL (ref 4–5)
ALBUMIN/CREAT UR: 6 MG/G CREAT (ref 0–29)
ALBUMIN/GLOB SERPL: 1.7 {RATIO} (ref 1.2–2.2)
ALP SERPL-CCNC: 126 IU/L (ref 44–121)
ALT SERPL-CCNC: 27 IU/L (ref 0–44)
AST SERPL-CCNC: 18 IU/L (ref 0–40)
BASOPHILS # BLD AUTO: 0 X10E3/UL (ref 0–0.2)
BASOPHILS NFR BLD AUTO: 0 %
BILIRUB SERPL-MCNC: 0.6 MG/DL (ref 0–1.2)
BUN SERPL-MCNC: 14 MG/DL (ref 6–24)
BUN/CREAT SERPL: 18 (ref 9–20)
CALCIUM SERPL-MCNC: 9 MG/DL (ref 8.7–10.2)
CHLORIDE SERPL-SCNC: 105 MMOL/L (ref 96–106)
CHOLEST SERPL-MCNC: 127 MG/DL (ref 100–199)
CO2 SERPL-SCNC: 24 MMOL/L (ref 20–29)
CREAT SERPL-MCNC: 0.76 MG/DL (ref 0.76–1.27)
CREAT UR-MCNC: 202.2 MG/DL
EGFRCR SERPLBLD CKD-EPI 2021: 112 ML/MIN/1.73
EOSINOPHIL # BLD AUTO: 0.1 X10E3/UL (ref 0–0.4)
EOSINOPHIL NFR BLD AUTO: 2 %
ERYTHROCYTE [DISTWIDTH] IN BLOOD BY AUTOMATED COUNT: 13.4 % (ref 11.6–15.4)
GLOBULIN SER CALC-MCNC: 2.7 G/DL (ref 1.5–4.5)
GLUCOSE SERPL-MCNC: 122 MG/DL (ref 65–99)
HBA1C MFR BLD: 5.8 % (ref 4.8–5.6)
HCT VFR BLD AUTO: 45.3 % (ref 37.5–51)
HDLC SERPL-MCNC: 34 MG/DL
HGB BLD-MCNC: 15.9 G/DL (ref 13–17.7)
IMM GRANULOCYTES # BLD AUTO: 0 X10E3/UL (ref 0–0.1)
IMM GRANULOCYTES NFR BLD AUTO: 0 %
IMMATURE CELLS  115398: NORMAL
LABORATORY COMMENT REPORT: ABNORMAL
LDLC SERPL CALC-MCNC: 80 MG/DL (ref 0–99)
LYMPHOCYTES # BLD AUTO: 2.8 X10E3/UL (ref 0.7–3.1)
LYMPHOCYTES NFR BLD AUTO: 47 %
MCH RBC QN AUTO: 32.3 PG (ref 26.6–33)
MCHC RBC AUTO-ENTMCNC: 35.1 G/DL (ref 31.5–35.7)
MCV RBC AUTO: 92 FL (ref 79–97)
MICROALBUMIN UR-MCNC: 11.9 UG/ML
MONOCYTES # BLD AUTO: 0.4 X10E3/UL (ref 0.1–0.9)
MONOCYTES NFR BLD AUTO: 7 %
MORPHOLOGY BLD-IMP: NORMAL
NEUTROPHILS # BLD AUTO: 2.6 X10E3/UL (ref 1.4–7)
NEUTROPHILS NFR BLD AUTO: 44 %
NRBC BLD AUTO-RTO: NORMAL %
PLATELET # BLD AUTO: 220 X10E3/UL (ref 150–450)
POTASSIUM SERPL-SCNC: 4.2 MMOL/L (ref 3.5–5.2)
PROT SERPL-MCNC: 7.4 G/DL (ref 6–8.5)
RBC # BLD AUTO: 4.93 X10E6/UL (ref 4.14–5.8)
SODIUM SERPL-SCNC: 143 MMOL/L (ref 134–144)
TRIGL SERPL-MCNC: 61 MG/DL (ref 0–149)
TSH SERPL DL<=0.005 MIU/L-ACNC: 0.93 UIU/ML (ref 0.45–4.5)
VLDLC SERPL CALC-MCNC: 13 MG/DL (ref 5–40)
WBC # BLD AUTO: 5.9 X10E3/UL (ref 3.4–10.8)

## 2022-05-13 ENCOUNTER — TELEPHONE (OUTPATIENT)
Dept: MEDICAL GROUP | Facility: MEDICAL CENTER | Age: 47
End: 2022-05-13
Payer: COMMERCIAL

## 2022-05-13 NOTE — TELEPHONE ENCOUNTER
Phone Number Called: [phone]    Call outcome: Did not leave a detailed message. Requested patient to call back.    Message: Spoke with wife and she stated he'd be back back in 30 min to try again later

## 2022-05-13 NOTE — TELEPHONE ENCOUNTER
Phone Number Called: [phone]    Call outcome: Left detailed message for patient. Informed to call back with any additional questions.    Message: [facility] to call back at [phone]

## 2022-05-13 NOTE — TELEPHONE ENCOUNTER
----- Message from LUÍS Sanderson sent at 5/13/2022  7:09 AM PDT -----  Please contact the patient and encouraged him to establish care with a new provider with [facility].  Please let him know that I have reviewed his lab test results, blood counts look good, alkaline phosphatase level is stable.  His A1c did increase back up into the prediabetic range to recommend a low-carb diet.  Again advised that the patient establish care with a new provider in clinic and we will go over his labs in much more detail.    LUÍS Sanderson

## 2022-05-14 NOTE — TELEPHONE ENCOUNTER
Phone Number Called: [phone]    Call outcome: Spoke to patient regarding message below.    Message: make apt for 6/14 with Dr. Henriquez

## 2022-05-18 ENCOUNTER — OFFICE VISIT (OUTPATIENT)
Dept: ENDOCRINOLOGY | Facility: MEDICAL CENTER | Age: 47
End: 2022-05-18
Attending: INTERNAL MEDICINE
Payer: COMMERCIAL

## 2022-05-18 VITALS
DIASTOLIC BLOOD PRESSURE: 80 MMHG | SYSTOLIC BLOOD PRESSURE: 122 MMHG | OXYGEN SATURATION: 95 % | HEART RATE: 72 BPM | WEIGHT: 245 LBS | HEIGHT: 69 IN | BODY MASS INDEX: 36.29 KG/M2

## 2022-05-18 DIAGNOSIS — Z79.84 LONG TERM (CURRENT) USE OF ORAL HYPOGLYCEMIC DRUGS: ICD-10-CM

## 2022-05-18 DIAGNOSIS — B35.3 TINEA PEDIS OF BOTH FEET: ICD-10-CM

## 2022-05-18 DIAGNOSIS — E11.9 CONTROLLED TYPE 2 DIABETES MELLITUS WITHOUT COMPLICATION, WITHOUT LONG-TERM CURRENT USE OF INSULIN (HCC): ICD-10-CM

## 2022-05-18 DIAGNOSIS — R74.8 ELEVATED ALKALINE PHOSPHATASE LEVEL: ICD-10-CM

## 2022-05-18 DIAGNOSIS — E11.9 TYPE 2 DIABETES MELLITUS WITHOUT COMPLICATION, WITHOUT LONG-TERM CURRENT USE OF INSULIN (HCC): ICD-10-CM

## 2022-05-18 DIAGNOSIS — E78.5 DYSLIPIDEMIA: ICD-10-CM

## 2022-05-18 DIAGNOSIS — E55.9 VITAMIN D DEFICIENCY: ICD-10-CM

## 2022-05-18 PROCEDURE — 99214 OFFICE O/P EST MOD 30 MIN: CPT | Performed by: INTERNAL MEDICINE

## 2022-05-18 PROCEDURE — 99212 OFFICE O/P EST SF 10 MIN: CPT | Performed by: INTERNAL MEDICINE

## 2022-05-18 RX ORDER — KETOCONAZOLE 20 MG/G
1 CREAM TOPICAL DAILY
Qty: 3 G | Refills: 2 | Status: SHIPPED | OUTPATIENT
Start: 2022-05-18 | End: 2024-01-19

## 2022-05-18 ASSESSMENT — FIBROSIS 4 INDEX: FIB4 SCORE: 0.74

## 2022-05-18 NOTE — PROGRESS NOTES
CHIEF COMPLAINT: Patient is here for follow up of Type 2 Diabetes Mellitus    HPI:     Arvind Jesus is a 46 y.o. male with Type 2 Diabetes Mellitus here for follow up.    Labs from 5/11/2022 show a1c is 5.8%  Labs from  11/16/2021 show a1c was 5.5%  Labs from July 13, 2021 show A1c was 6.1%  Labs from 8/22/2020 HbA1c was 6.3%      He was not accompanied by his daughter today.  Previously he was on Ozempic which was not covered by his insurance        On follow-up he is now on   Metformin 850 mg twice a day and   Trulicity 1.5 mg weekly.       He denies any side effects  He lost 2-3 lbs since the increase in Trulicity   He reports that he is happy w/ his weight loss  He is exercising regularly      He has hyperlipidemia and is on atorvastatin 10 mg daily  He denies history of coronary artery disease.  He denies muscle aches and muscle weakness  LDL-C was 80 on May 2022  LDL cholesterol was 70 on July 2021        He does not have albuminuria  U ACR was less than 30 on May 2022  U ACR was less than 30 on January 2021        He saw his ophthalmologist Dr. Grady on 8/26/2021        Incidentally he has a mildly elevated alk phos of 126 ( ref )  Work up by Dr. Franco showed normal GGT          BG Diary:  Patient forgot his meter    Weight has decreased 2-3 pounds over last 6 months    Diabetes Complications   Retinopathy: No known retinopathy.  Last eye exam: 8/26/2021  Neuropathy: Denies paresthesias or numbness in hands or feet. Denies any foot wounds.  Exercise: Minimal.  Diet: Fair.  Patient's medications, allergies, and social histories were reviewed and updated as appropriate.    ROS:     CONS:     No fever, no chills   EYES:     No diplopia, no blurry vision   CV:           No chest pain, no palpitations   PULM:     No SOB, no cough, no hemoptysis.   GI:            No nausea, no vomiting, no diarrhea, no constipation   ENDO:     No polyuria, no polydipsia, no heat intolerance, no cold intolerance  "      Past Medical History:  Problem List:  2021: Elevated alkaline phosphatase level  2020: Long term (current) use of oral hypoglycemic drugs  2020: Vitamin D deficiency  2020: Hypokalemia  2020: Dyslipidemia  2020: Controlled type 2 diabetes mellitus without complication,   without long-term current use of insulin ([facility])  2020: Class 1 obesity with body mass index (BMI) of 33.0 to 33.9   in adult  2020: Essential hypertension  2020: Tinea pedis of both feet      Past Surgical History:  No past surgical history on file.     Allergies:  Patient has no known allergies.     Social History:  Social History     Tobacco Use   • Smoking status: Former Smoker     Packs/day: 0.50     Years: 10.00     Pack years: 5.00     Quit date:      Years since quittin.3   • Smokeless tobacco: Never Used   Vaping Use   • Vaping Use: Never used   Substance Use Topics   • Alcohol use: Not Currently   • Drug use: Never        Family History:   family history includes Diabetes in his brother and brother; Hypertension in his brother, brother, and father.      PHYSICAL EXAM:   OBJECTIVE:  Vital signs: /80   Pulse 72   Ht 1.753 m (5' 9\")   Wt 111 kg (245 lb)   SpO2 95%   BMI 36.18 kg/m²   GENERAL: Well-developed, well-nourished in no apparent distress.   EYE:  No ocular asymmetry, PERRLA  HENT: Pink, moist mucous membranes.    NECK: No thyromegaly.   CARDIOVASCULAR:  No murmurs  LUNGS: Clear breath sounds  ABDOMEN: Soft, nontender   EXTREMITIES: No clubbing, cyanosis, or edema.   NEUROLOGICAL: No gross focal motor abnormalities   LYMPH: No cervical adenopathy palpated.   SKIN: No rashes, lesions.       Labs:  Lab Results   Component Value Date/Time    HBA1C 5.8 (H) 2022 05:02 AM    HBA1C 5.5 2021 02:21 PM        Lab Results   Component Value Date/Time    WBC 5.9 2022 05:02 AM    WBC 8.1 2020 03:17 PM    RBC 4.93 2022 05:02 AM    RBC 4.66 (L) 2020 03:17 PM    " HEMOGLOBIN 15.9 05/11/2022 05:02 AM    HEMOGLOBIN 15.0 05/29/2020 03:17 PM    MCV 92 05/11/2022 05:02 AM    MCV 91.6 05/29/2020 03:17 PM    MCH 32.3 05/11/2022 05:02 AM    MCH 32.2 05/29/2020 03:17 PM    MCHC 35.1 05/11/2022 05:02 AM    MCHC 35.1 05/29/2020 03:17 PM    RDW 13.4 05/11/2022 05:02 AM    RDW 40.8 05/29/2020 03:17 PM    MPV 10.6 05/29/2020 03:17 PM       Lab Results   Component Value Date/Time    SODIUM 143 05/11/2022 05:02 AM    SODIUM 139 01/06/2021 10:42 AM    POTASSIUM 4.2 05/11/2022 05:02 AM    POTASSIUM 3.7 01/06/2021 10:42 AM    CHLORIDE 105 05/11/2022 05:02 AM    CHLORIDE 103 01/06/2021 10:42 AM    CO2 24 05/11/2022 05:02 AM    CO2 26 01/06/2021 10:42 AM    ANION 10.0 01/06/2021 10:42 AM    GLUCOSE 122 (H) 05/11/2022 05:02 AM    GLUCOSE 98 01/06/2021 10:42 AM    BUN 14 05/11/2022 05:02 AM    BUN 10 01/06/2021 10:42 AM    CREATININE 0.76 05/11/2022 05:02 AM    CREATININE 0.71 01/06/2021 10:42 AM    CALCIUM 9.0 05/11/2022 05:02 AM    CALCIUM 8.8 01/06/2021 10:42 AM    ASTSGOT 18 05/11/2022 05:02 AM    ASTSGOT 22 01/06/2021 10:42 AM    ALTSGPT 27 05/11/2022 05:02 AM    ALTSGPT 24 01/06/2021 10:42 AM    TBILIRUBIN 0.6 05/11/2022 05:02 AM    TBILIRUBIN 0.7 01/06/2021 10:42 AM    ALBUMIN 4.7 05/11/2022 05:02 AM    ALBUMIN 4.8 01/06/2021 10:42 AM    TOTPROTEIN 7.4 05/11/2022 05:02 AM    TOTPROTEIN 7.4 01/06/2021 10:42 AM    GLOBULIN 2.7 05/11/2022 05:02 AM    GLOBULIN 2.6 01/06/2021 10:42 AM    AGRATIO 1.7 05/11/2022 05:02 AM    AGRATIO 1.8 01/06/2021 10:42 AM       Lab Results   Component Value Date/Time    CHOLSTRLTOT 134 06/27/2020 0935    TRIGLYCERIDE 58 06/27/2020 0935    HDL 32 (A) 06/27/2020 0935    LDL 90 06/27/2020 0935       Lab Results   Component Value Date/Time    MALBCRT see below 01/06/2021 10:41 AM    MICROALBUR <1.2 01/06/2021 10:41 AM    MICRALB 11.9 05/11/2022 05:02 AM        Lab Results   Component Value Date/Time    TSHULTRASEN 0.872 05/29/2020 1517           ASSESSMENT/PLAN:     1.  Controlled type 2 diabetes mellitus without complication, without long-term current use of insulin (HCC)  Well-controlled  Continue metformin   Continue Trulicity 1.5 mg weekly   He is up-to-date with his labs  Follow-up in 6 months      2. Dyslipidemia  Controlled  Continue atorvastatin  Repeat fasting lipids in 12 months    3. Long term (current) use of oral hypoglycemic drugs  Patient is on oral agents and a GLP-1 for type 2 diabetes management      5. Elevated alkaline phosphatase level   previous GGT test was normal  He is asymptomatic  And denies bone pain or joint pains  Alk phos levels not significantly elevated  Will check alk phos isoenzymes and bone specific alkaline phosphatase with next labs  Also check calcium and vitamin D and intact PTH levels with next labs      Return in about 6 months (around 11/18/2022).      Thank you kindly for allowing me to participate in the diabetes care plan for this patient.    Archie Em MD, [place], [facility]  10/06/20    CC:   Jayy Franco M.D.

## 2022-05-18 NOTE — PROGRESS NOTES
Foot Exam:  Monofilament: done  Monofilament testing with a 10 gram force: sensation intact: intact bilaterally  Visual Inspection: Feet without maceration, ulcers, fissures.  Pedal pulses: intact bilaterally

## 2022-05-19 ENCOUNTER — APPOINTMENT (OUTPATIENT)
Dept: MEDICAL GROUP | Facility: MEDICAL CENTER | Age: 47
End: 2022-05-19
Payer: COMMERCIAL

## 2022-06-13 PROBLEM — Z00.00 PREVENTATIVE HEALTH CARE: Status: ACTIVE | Noted: 2022-06-13

## 2022-06-13 NOTE — PROGRESS NOTES
Subjective:     CC:  Diagnoses of Controlled type 2 diabetes mellitus without complication, without long-term current use of insulin (HCC), Essential hypertension, Dyslipidemia, Vitamin D deficiency, Tinea pedis of both feet, Preventative health care, Obesity (BMI 30-39.9), Genetic screening, and Encounter to establish care with new doctor were pertinent to this visit.    HISTORY OF THE PRESENT ILLNESS: Patient is a 46 y.o. male. This pleasant patient is here today to establish care and discuss chronic conditions. His prior PCP was Dr. Franco. Translation was done via certified  through Ipad today.    Problem   Preventative Health Care    Patient is here to establish care today. Feels well overall.    Discussed and offered the no cost genetic screening through [facility]. Patient is interested in participating.     Vitamin D Deficiency    Chronic condition.  On vitamin D3 2000 international units daily.       Dyslipidemia    Chronic condition.   Current regimen: atorvastatin 10mg qd   He reports compliance and tolerating medication well. Denies musculoskeletal pain, headache, diarrhea. He does not need medication refill today. No acute concerns at this time.       Controlled Type 2 Diabetes Mellitus Without Complication, Without Long-Term Current Use of Insulin ([facility])    Chronic condition. Onset 05/2020. AYAH and insulin Ab are both negative. Followed by endocrinology every 6 months.  Current medication regimen: metformin 850mg qd, Trulicity 1.5mg weekly  Patient tolerating and reports compliant with medications. Does not need refill of medications today. Denies vision changes, dry mouth, chest pain, nausea/vomiting/diarrhea, polydipsia, polyphagia, polyuria, hypoglycemia, numbness/tingling. He checks his feet at home.  Last eye exam: done 08/2021  Last foot exam: done 05/2022  Diet: tries to eat healthy in general  Exercise: regular exercise  Vaccines: flu due, PPSV23 06/2020, Tdap  06/2020     Obesity (Bmi 30-39.9)    Chronic condition. He tries to eat healthy in general. He does regularly exercise daily with home exercises.       Essential Hypertension    Chronic condition.  Current medication regimen: amlodipine 5mg qd  Patient tolerating and reports compliant with medications. He does not regularly monitor blood pressure at home. Does not need refill of medications today. Denies headache, dizziness, vision changes, chest pain, dyspnea, edema.       Tinea Pedis of Both Feet    Chronic condition. Scaly soles, cracks of feet  Improving with ketoconazole topical           Current Outpatient Medications Ordered in Epic   Medication Sig Dispense Refill   • ketoconazole (NIZORAL) 2 % Cream Apply 1 Application topically every day. 3 g 2   • metFORMIN (GLUCOPHAGE) 850 MG Tab Take 1 Tablet by mouth every day. 90 Tablet 3   • amLODIPine (NORVASC) 5 MG Tab Take 1 Tablet by mouth every day. 90 Tablet 3   • atorvastatin (LIPITOR) 10 MG Tab Take 1 Tablet by mouth every day. 90 Tablet 3   • Dulaglutide (TRULICITY) 1.5 MG/0.5ML Solution Pen-injector Inject 0.5 mL under the skin every 7 days. 4 pens per month 6 mL 2   • Lancets (ONETOUCH DELICA PLUS [id number]) Misc USE TO TEST BLOOD SUGAR ONCE A DAY IN EARLY MORNING BEFORE FIRST MEAL 100 Each 2   • vitamin D (CHOLECALCIFEROL) 1000 Unit (25 mcg) Tab Take 2,000 Units by mouth every day.     • Alcohol Swabs Wipe site with prep pad prior to injection. 100 Each 2   • Blood Glucose Monitoring Suppl (ONETOUCH VERIO) w/Device Kit USE TO TEST BLOOD SUGAR ONCE A DAY IN EARLY MORNING BEFORE FIRST MEAL     • Blood Glucose Meter Kit Test blood sugar as recommended by provider.Insurance preferred blood glucose monitoring kit. 1 Kit 0     No current Epic-ordered facility-administered medications on file.     Social history  Living situation: lives with female partner at home  Occupation: works as   Alcohol/tobacco/illicit drugs: former smoker 0.5ppd x 10 yrs, quit  "2015, denies EtOH or illicit drugs    Health Maintenance: reviewed  Vaccines: due for COVID (reports had 3 shots), PPSV23 06/2020, Tdap 06/2020    ROS:   Gen: no fevers/chills, no changes in weight  Eyes: no changes in vision  ENT: no sore throat  Pulm: no sob, no cough  CV: no chest pain, no palpitations  GI: no nausea/vomiting, no diarrhea  : no dysuria  MSk: no myalgias  Skin: no rash  Neuro: no headaches, no numbness/tingling      Objective:     Exam: /76 (BP Location: Left arm, Patient Position: Sitting, BP Cuff Size: Adult)   Pulse 69   Temp 36.9 °C (98.4 °F) (Temporal)   Resp 16   Ht 1.753 m (5' 9\")   Wt 111 kg (244 lb 11.4 oz)   SpO2 98%  Body mass index is 36.14 kg/m².    General: Normal appearing. No distress.  HEENT: Normocephalic. Nasal mucosa benign, oropharynx is without erythema, edema or exudates.   Neck: Supple without JVD or bruit. Thyroid is not enlarged.  Pulmonary: Clear to ausculation. Normal effort. No rales, ronchi, or wheezing.  Cardiovascular: Regular rate and rhythm without murmur. Carotid and radial pulses are intact and equal bilaterally.  Abdomen: Soft, nontender, nondistended. Normal bowel sounds.  Neurologic: Grossly nonfocal  Skin: Warm and dry. No obvious lesions.  Musculoskeletal: Normal gait. No extremity cyanosis, clubbing, or edema.  Psych: Normal mood and affect. Alert and oriented x3. Judgment and insight is normal.    Labs:   Lab Results   Component Value Date/Time    WBC 5.9 05/11/2022 05:02 AM    WBC 8.1 05/29/2020 03:17 PM    RBC 4.93 05/11/2022 05:02 AM    RBC 4.66 (L) 05/29/2020 03:17 PM    HEMOGLOBIN 15.9 05/11/2022 05:02 AM    HEMOGLOBIN 15.0 05/29/2020 03:17 PM    HEMATOCRIT 45.3 05/11/2022 05:02 AM    HEMATOCRIT 42.7 05/29/2020 03:17 PM    MCV 92 05/11/2022 05:02 AM    MCV 91.6 05/29/2020 03:17 PM    MCH 32.3 05/11/2022 05:02 AM    MCH 32.2 05/29/2020 03:17 PM    MCHC 35.1 05/11/2022 05:02 AM    MCHC 35.1 05/29/2020 03:17 PM    RDW 13.4 05/11/2022 05:02 " AM    RDW 40.8 05/29/2020 03:17 PM    PLATELETCT 220 05/11/2022 05:02 AM    PLATELETCT 185 05/29/2020 03:17 PM    MPV 10.6 05/29/2020 03:17 PM      Lab Results   Component Value Date/Time    SODIUM 143 05/11/2022 05:02 AM    SODIUM 139 01/06/2021 10:42 AM    POTASSIUM 4.2 05/11/2022 05:02 AM    POTASSIUM 3.7 01/06/2021 10:42 AM    CHLORIDE 105 05/11/2022 05:02 AM    CHLORIDE 103 01/06/2021 10:42 AM    CO2 24 05/11/2022 05:02 AM    CO2 26 01/06/2021 10:42 AM    ANION 10.0 01/06/2021 10:42 AM    GLUCOSE 122 (H) 05/11/2022 05:02 AM    GLUCOSE 98 01/06/2021 10:42 AM    BUN 14 05/11/2022 05:02 AM    BUN 10 01/06/2021 10:42 AM    CREATININE 0.76 05/11/2022 05:02 AM    CREATININE 0.71 01/06/2021 10:42 AM    CALCIUM 9.0 05/11/2022 05:02 AM    CALCIUM 8.8 01/06/2021 10:42 AM    ASTSGOT 18 05/11/2022 05:02 AM    ASTSGOT 22 01/06/2021 10:42 AM    ALTSGPT 27 05/11/2022 05:02 AM    ALTSGPT 24 01/06/2021 10:42 AM    TBILIRUBIN 0.6 05/11/2022 05:02 AM    TBILIRUBIN 0.7 01/06/2021 10:42 AM    ALBUMIN 4.7 05/11/2022 05:02 AM    ALBUMIN 4.8 01/06/2021 10:42 AM    TOTPROTEIN 7.4 05/11/2022 05:02 AM    TOTPROTEIN 7.4 01/06/2021 10:42 AM    GLOBULIN 2.7 05/11/2022 05:02 AM    GLOBULIN 2.6 01/06/2021 10:42 AM    AGRATIO 1.7 05/11/2022 05:02 AM    AGRATIO 1.8 01/06/2021 10:42 AM     Lab Results   Component Value Date/Time    HBA1C 5.8 (H) 05/11/2022 05:02 AM    HBA1C 5.5 11/16/2021 02:21 PM      Lab Results   Component Value Date/Time    CHOLSTRLTOT 127 05/11/2022 0502    TRIGLYCERIDE 61 05/11/2022 0502    HDL 34 (L) 05/11/2022 0502    LDL 61 01/06/2021 1042     Lab Results   Component Value Date/Time    TSHULTRASEN 0.872 05/29/2020 1517     25-Hydroxy   Vitamin D 25 (ng/mL)   Date Value   05/11/2022 39.3   11/08/2021 34.6   01/06/2021 17 (L)   08/03/2020 19 (L)     Lab Results   Component Value Date/Time    MALBCRT see below 01/06/2021 10:41 AM    MICROALBUR <1.2 01/06/2021 10:41 AM    MICRALB 11.9 05/11/2022 05:02 AM        Assessment &  Plan:   46 y.o. male with the following -    1. Controlled type 2 diabetes mellitus without complication, without long-term current use of insulin (HCC)  Chronic condition, controlled with medications. Most recent A1C 5.9. Followed by endocrinology. He needs a new referral to continue seeing endocrinology.  - cont current medications: metformin 850mg daily, Trulicity 1.5mg weekly  - Discussed daily self foot exam, eye care, and regular exercise with patient  - Patient instructed to follow a low carbohydrate diet  - Follow up in 6 months or earlier as needed  - Referral to Endocrinology    2. Essential hypertension  Chronic condition, stable.  - cont current regimen: amlodipine 5mg qd    3. Dyslipidemia  Chronic condition, stable.  - cont current regimen: atorvastatin 10mg qhs    4. Vitamin D deficiency  Chronic condition, stable.  - cont current regimen: vitamin D3 2000 IU daily    5. Tinea pedis of both feet  Chronic condition, stable.  - cont current regimen: ketoconazole 2% cream daily    6. Preventative health care  - recommended age appropriate vaccinations    7. Obesity (BMI 30-39.9)  - continue healthy diet, regular exercise    8. Genetic screening  - Referral to Genetic Research Studies    9. Encounter to establish care with new doctor        Return in about 6 months (around 12/14/2022) for chronic medical conditions.    Please note that this dictation was created using voice recognition software. I have made every reasonable attempt to correct obvious errors, but I expect that there are errors of grammar and possibly content that I did not discover before finalizing the note.

## 2022-06-14 ENCOUNTER — OFFICE VISIT (OUTPATIENT)
Dept: MEDICAL GROUP | Facility: MEDICAL CENTER | Age: 47
End: 2022-06-14
Payer: COMMERCIAL

## 2022-06-14 VITALS
OXYGEN SATURATION: 98 % | BODY MASS INDEX: 36.24 KG/M2 | RESPIRATION RATE: 16 BRPM | SYSTOLIC BLOOD PRESSURE: 114 MMHG | TEMPERATURE: 98.4 F | WEIGHT: 244.71 LBS | HEIGHT: 69 IN | DIASTOLIC BLOOD PRESSURE: 76 MMHG | HEART RATE: 69 BPM

## 2022-06-14 DIAGNOSIS — B35.3 TINEA PEDIS OF BOTH FEET: ICD-10-CM

## 2022-06-14 DIAGNOSIS — E78.5 DYSLIPIDEMIA: ICD-10-CM

## 2022-06-14 DIAGNOSIS — I10 ESSENTIAL HYPERTENSION: ICD-10-CM

## 2022-06-14 DIAGNOSIS — E11.9 CONTROLLED TYPE 2 DIABETES MELLITUS WITHOUT COMPLICATION, WITHOUT LONG-TERM CURRENT USE OF INSULIN (HCC): ICD-10-CM

## 2022-06-14 DIAGNOSIS — Z13.79 GENETIC SCREENING: ICD-10-CM

## 2022-06-14 DIAGNOSIS — Z76.89 ENCOUNTER TO ESTABLISH CARE WITH NEW DOCTOR: ICD-10-CM

## 2022-06-14 DIAGNOSIS — E66.9 OBESITY (BMI 30-39.9): ICD-10-CM

## 2022-06-14 DIAGNOSIS — Z00.00 PREVENTATIVE HEALTH CARE: ICD-10-CM

## 2022-06-14 DIAGNOSIS — E55.9 VITAMIN D DEFICIENCY: ICD-10-CM

## 2022-06-14 PROCEDURE — 99214 OFFICE O/P EST MOD 30 MIN: CPT | Performed by: STUDENT IN AN ORGANIZED HEALTH CARE EDUCATION/TRAINING PROGRAM

## 2022-06-14 ASSESSMENT — FIBROSIS 4 INDEX: FIB4 SCORE: 0.74

## 2022-10-14 DIAGNOSIS — E11.9 CONTROLLED TYPE 2 DIABETES MELLITUS WITHOUT COMPLICATION, WITHOUT LONG-TERM CURRENT USE OF INSULIN (HCC): ICD-10-CM

## 2022-10-14 RX ORDER — DULAGLUTIDE 1.5 MG/.5ML
0.5 INJECTION, SOLUTION SUBCUTANEOUS
Qty: 6 ML | Refills: 2 | Status: SHIPPED | OUTPATIENT
Start: 2022-10-14 | End: 2022-11-17 | Stop reason: SDUPTHER

## 2022-11-17 DIAGNOSIS — E11.9 CONTROLLED TYPE 2 DIABETES MELLITUS WITHOUT COMPLICATION, WITHOUT LONG-TERM CURRENT USE OF INSULIN (HCC): ICD-10-CM

## 2022-11-17 RX ORDER — DULAGLUTIDE 1.5 MG/.5ML
0.5 INJECTION, SOLUTION SUBCUTANEOUS
Qty: 6 ML | Refills: 3 | Status: SHIPPED | OUTPATIENT
Start: 2022-11-17 | End: 2022-12-14 | Stop reason: SDUPTHER

## 2022-12-14 ENCOUNTER — OFFICE VISIT (OUTPATIENT)
Dept: MEDICAL GROUP | Facility: MEDICAL CENTER | Age: 47
End: 2022-12-14
Payer: COMMERCIAL

## 2022-12-14 VITALS
OXYGEN SATURATION: 96 % | TEMPERATURE: 97.7 F | HEIGHT: 69 IN | HEART RATE: 73 BPM | SYSTOLIC BLOOD PRESSURE: 122 MMHG | BODY MASS INDEX: 36.57 KG/M2 | DIASTOLIC BLOOD PRESSURE: 70 MMHG | WEIGHT: 246.91 LBS | RESPIRATION RATE: 16 BRPM

## 2022-12-14 DIAGNOSIS — E66.9 OBESITY (BMI 30-39.9): ICD-10-CM

## 2022-12-14 DIAGNOSIS — E55.9 VITAMIN D DEFICIENCY: ICD-10-CM

## 2022-12-14 DIAGNOSIS — E11.9 CONTROLLED TYPE 2 DIABETES MELLITUS WITHOUT COMPLICATION, WITHOUT LONG-TERM CURRENT USE OF INSULIN (HCC): ICD-10-CM

## 2022-12-14 DIAGNOSIS — Z11.59 NEED FOR HEPATITIS C SCREENING TEST: ICD-10-CM

## 2022-12-14 DIAGNOSIS — Z12.11 COLON CANCER SCREENING: ICD-10-CM

## 2022-12-14 DIAGNOSIS — Z00.00 PREVENTATIVE HEALTH CARE: ICD-10-CM

## 2022-12-14 DIAGNOSIS — I10 ESSENTIAL HYPERTENSION: ICD-10-CM

## 2022-12-14 DIAGNOSIS — R74.8 ELEVATED ALKALINE PHOSPHATASE LEVEL: ICD-10-CM

## 2022-12-14 DIAGNOSIS — E78.5 DYSLIPIDEMIA: ICD-10-CM

## 2022-12-14 LAB
HBA1C MFR BLD: 6.5 % (ref 0–5.6)
INT CON NEG: NEGATIVE
INT CON POS: POSITIVE
RETINAL SCREEN: NORMAL

## 2022-12-14 PROCEDURE — 99214 OFFICE O/P EST MOD 30 MIN: CPT | Mod: 25 | Performed by: STUDENT IN AN ORGANIZED HEALTH CARE EDUCATION/TRAINING PROGRAM

## 2022-12-14 PROCEDURE — 92250 FUNDUS PHOTOGRAPHY W/I&R: CPT | Mod: TC | Performed by: STUDENT IN AN ORGANIZED HEALTH CARE EDUCATION/TRAINING PROGRAM

## 2022-12-14 PROCEDURE — 90677 PCV20 VACCINE IM: CPT | Performed by: STUDENT IN AN ORGANIZED HEALTH CARE EDUCATION/TRAINING PROGRAM

## 2022-12-14 PROCEDURE — 83036 HEMOGLOBIN GLYCOSYLATED A1C: CPT | Performed by: STUDENT IN AN ORGANIZED HEALTH CARE EDUCATION/TRAINING PROGRAM

## 2022-12-14 PROCEDURE — 90471 IMMUNIZATION ADMIN: CPT | Performed by: STUDENT IN AN ORGANIZED HEALTH CARE EDUCATION/TRAINING PROGRAM

## 2022-12-14 RX ORDER — DULAGLUTIDE 1.5 MG/.5ML
1 INJECTION, SOLUTION SUBCUTANEOUS
Qty: 12 ML | Refills: 3 | Status: SHIPPED | OUTPATIENT
Start: 2022-12-14 | End: 2023-02-08 | Stop reason: SDUPTHER

## 2022-12-14 RX ORDER — BLOOD SUGAR DIAGNOSTIC
STRIP MISCELLANEOUS
COMMUNITY
Start: 2022-11-04

## 2022-12-14 ASSESSMENT — FIBROSIS 4 INDEX: FIB4 SCORE: 0.76

## 2022-12-14 NOTE — PROGRESS NOTES
Subjective:     CC: chronic conditions follow up    HPI:   Arvind presents today for chronic conditions follow up    Problem   Preventative Health Care    Patient is due for annual labs. Feels well overall.     Elevated Alkaline Phosphatase Level    Chronic condition.   Ref. Range 8/3/2020 09:38 8/10/2020 15:27 8/22/2020 09:49 1/6/2021 10:41 1/6/2021 10:42   Alkaline Phosphatase Latest Ref Range: 30 - 99 U/L 100 (H)    106 (H)        Vitamin D Deficiency    Chronic condition.  On vitamin D3 2000 international units daily.       Dyslipidemia    Chronic condition.  Current regimen: atorvastatin 10mg qd   He reports compliance and tolerating medication well. Denies musculoskeletal pain, headache, diarrhea. He does not need medication refill today. No acute concerns at this time.       Controlled Type 2 Diabetes Mellitus Without Complication, Without Long-Term Current Use of Insulin (Hcc)    Chronic condition. Onset 05/2020. AYAH and insulin Ab are both negative. Followed by endocrinology every 6 months.  Current medication regimen: metformin 850mg qd, Trulicity 1.5mg weekly  Patient tolerating and reports compliant with medications. Does not need refill of medications today. Denies vision changes, dry mouth, chest pain, nausea/vomiting/diarrhea, polydipsia, polyphagia, polyuria, hypoglycemia, numbness/tingling. He checks his feet at home.  Last eye exam: done 08/2021  Last foot exam: done 05/2022  Diet: tries to eat healthy in general  Exercise: regular exercise  Vaccines: flu due, PPSV23 06/2020, Tdap 06/2020     Obesity (Bmi 30-39.9)    Chronic condition. He tries to eat healthy in general. He does regularly exercise daily with home exercises.       Essential Hypertension    Chronic condition.  Current medication regimen: amlodipine 5mg qd  Patient tolerating and reports compliant with medications. He does not regularly monitor blood pressure at home. Does not need refill of medications today. Denies headache,  dizziness, vision changes, chest pain, dyspnea, edema.           Current Outpatient Medications Ordered in Epic   Medication Sig Dispense Refill    Dulaglutide (TRULICITY) 1.5 MG/0.5ML Solution Pen-injector Inject 1 mL under the skin every 7 days. 4 pens per month 12 mL 3    ONETOUCH VERIO strip USE TO TEST BLOOD SUGAR ONCE A DAY IN EARLY MORNING BEFORE FIRST MEAL      ketoconazole (NIZORAL) 2 % Cream Apply 1 Application topically every day. 3 g 2    metFORMIN (GLUCOPHAGE) 850 MG Tab Take 1 Tablet by mouth every day. 90 Tablet 3    amLODIPine (NORVASC) 5 MG Tab Take 1 Tablet by mouth every day. 90 Tablet 3    atorvastatin (LIPITOR) 10 MG Tab Take 1 Tablet by mouth every day. 90 Tablet 3    Lancets (ONETOUCH DELICA PLUS [id number]) Misc USE TO TEST BLOOD SUGAR ONCE A DAY IN EARLY MORNING BEFORE FIRST MEAL 100 Each 2    vitamin D (CHOLECALCIFEROL) 1000 Unit (25 mcg) Tab Take 2,000 Units by mouth every day.      Alcohol Swabs Wipe site with prep pad prior to injection. 100 Each 2    Blood Glucose Monitoring Suppl (ONETOUCH VERIO) w/Device Kit USE TO TEST BLOOD SUGAR ONCE A DAY IN EARLY MORNING BEFORE FIRST MEAL      Blood Glucose Meter Kit Test blood sugar as recommended by provider.Insurance preferred blood glucose monitoring kit. 1 Kit 0     No current Epic-ordered facility-administered medications on file.     Social history  Living situation: lives with female partner at home  Occupation: works as   Alcohol/tobacco/illicit drugs: former smoker 0.5ppd x 10 yrs, quit 2015, denies EtOH or illicit drugs     Health Maintenance: reviewed  Vaccines: due for COVID (reports had 3 shots), PPSV23 06/2020, Tdap 06/2020     ROS:   Gen: no fevers/chills, no changes in weight  Eyes: no changes in vision  Pulm: no sob, no cough  CV: no chest pain, no palpitations  GI: no nausea/vomiting, no diarrhea  : no dysuria  MSk: no myalgias  Skin: no rash  Neuro: no headaches, no numbness/tingling    Objective:     Exam:  /70  "(BP Location: Left arm, Patient Position: Sitting, BP Cuff Size: Adult)   Pulse 73   Temp 36.5 °C (97.7 °F) (Temporal)   Resp 16   Ht 1.753 m (5' 9\")   Wt 112 kg (246 lb 14.6 oz)   SpO2 96%   BMI 36.46 kg/m²  Body mass index is 36.46 kg/m².    General: Normal appearing. No distress.  Pulmonary: Clear to ausculation. Normal effort. No rales, ronchi, or wheezing.  Cardiovascular: Regular rate and rhythm without murmur.  Abdomen: Soft, nontender, nondistended. Normal bowel sounds.  Neurologic: Grossly nonfocal  Skin: Warm and dry. No obvious lesions.  Musculoskeletal: Normal gait. No extremity cyanosis, clubbing, or edema.  Psych: Normal mood and affect. Alert and oriented x3. Judgment and insight is normal.    Labs: no new lab results since last visit    Assessment & Plan:     47 y.o. male with the following -     1. Controlled type 2 diabetes mellitus without complication, without long-term current use of insulin (HCC)  Chronic condition, needs improvement. Most recent A1C 6.5.  - cont current medications: metformin 850mg qd, increase Trulicity 1.5mg to 3mg weekly for better diabetes control  - Discussed daily self foot exam, eye care, and regular exercise with patient  - Patient instructed to follow a low carbohydrate diet  - Follow up in 6 months or earlier as needed  - POCT Hemoglobin A1C  - POCT Retinal Eye Exam  - MICROALBUMIN CREAT RATIO URINE; Future  - Dulaglutide (TRULICITY) 1.5 MG/0.5ML Solution Pen-injector; Inject 1 mL under the skin every 7 days. 4 pens per month  Dispense: 12 mL; Refill: 3    2. Essential hypertension  Chronic condition, stable.  - cont current regimen: amlodipine 5mg daily  - Comp Metabolic Panel; Future    3. Dyslipidemia  Chronic condition, stable.  - cont current regimen: atorvastatin 10mg daily  - Lipid Profile; Future  - TSH WITH REFLEX TO FT4; Future    4. Elevated alkaline phosphatase level  Chronic condition. Plan to reassess.  - Comp Metabolic Panel; Future  - PTH " INTACT (PTH ONLY); Future  - ALKALINE PHOSPHATASE ISOENZYMES; Future  - ALK PHOS, BONE SPEC.; Future    5. Vitamin D deficiency  Chronic condition, stable. Plan to reassess.  - VITAMIN D,25 HYDROXY (DEFICIENCY); Future    6. Obesity (BMI 30-39.9)  - Lipid Profile; Future  - TSH WITH REFLEX TO FT4; Future  - Patient identified as having weight management issue.  Appropriate orders and counseling given.  - Comp Metabolic Panel; Future    7. Preventative health care  - Pneumococcal Conjugate Vaccine 20-Valent (19 yrs+)  - HEP B SURFACE AB; Future    8. Colon cancer screening  - Referral to GI for Colonoscopy    9. Need for hepatitis C screening test  - HEP C VIRUS ANTIBODY; Future    Return in about 6 months (around 6/14/2023) for chronic medical conditions.    Please note that this dictation was created using voice recognition software. I have made every reasonable attempt to correct obvious errors, but I expect that there are errors of grammar and possibly content that I did not discover before finalizing the note.

## 2022-12-15 LAB
25(OH)D3+25(OH)D2 SERPL-MCNC: 41.1 NG/ML (ref 30–100)
ALBUMIN SERPL-MCNC: 5 G/DL (ref 4–5)
ALBUMIN/GLOB SERPL: 2.3 {RATIO} (ref 1.2–2.2)
ALP BONE CFR SERPL: 59 % (ref 12–68)
ALP BONE SERPL-MCNC: 28.2 UG/L (ref 7.5–26.4)
ALP INTEST CFR SERPL: 20 % (ref 0–18)
ALP LIVER CFR SERPL: 21 % (ref 13–88)
ALP SERPL-CCNC: 117 IU/L (ref 44–121)
ALT SERPL-CCNC: 43 IU/L (ref 0–44)
AST SERPL-CCNC: 24 IU/L (ref 0–40)
BILIRUB SERPL-MCNC: 1 MG/DL (ref 0–1.2)
BUN SERPL-MCNC: 10 MG/DL (ref 6–24)
BUN/CREAT SERPL: 16 (ref 9–20)
CALCIUM SERPL-MCNC: 9 MG/DL (ref 8.7–10.2)
CHLORIDE SERPL-SCNC: 104 MMOL/L (ref 96–106)
CO2 SERPL-SCNC: 24 MMOL/L (ref 20–29)
CREAT SERPL-MCNC: 0.62 MG/DL (ref 0.76–1.27)
EGFRCR SERPLBLD CKD-EPI 2021: 118 ML/MIN/1.73
GLOBULIN SER CALC-MCNC: 2.2 G/DL (ref 1.5–4.5)
GLUCOSE SERPL-MCNC: 120 MG/DL (ref 70–99)
POTASSIUM SERPL-SCNC: 3.6 MMOL/L (ref 3.5–5.2)
PROT SERPL-MCNC: 7.2 G/DL (ref 6–8.5)
PTH-INTACT SERPL-MCNC: 47 PG/ML (ref 15–65)
SODIUM SERPL-SCNC: 143 MMOL/L (ref 134–144)

## 2023-01-30 DIAGNOSIS — I10 ESSENTIAL HYPERTENSION: ICD-10-CM

## 2023-01-30 NOTE — TELEPHONE ENCOUNTER
1. Name: Arvind Jesus      Call Back Number: [phone] (home)         How would the patient prefer to be contacted with a response: Phone call OK to leave a detailed message    Pt Arvind came in and he needs a refill on:    - Dulaglutide (TRULICITY) 1.5 MG/0.5ML    He said Pharmacy is saying theres a hold on the refill can someone look into this and cb before end of day. [place]ian speaking only    Thank you

## 2023-02-08 DIAGNOSIS — E11.9 CONTROLLED TYPE 2 DIABETES MELLITUS WITHOUT COMPLICATION, WITHOUT LONG-TERM CURRENT USE OF INSULIN (HCC): ICD-10-CM

## 2023-02-08 RX ORDER — DULAGLUTIDE 1.5 MG/.5ML
1 INJECTION, SOLUTION SUBCUTANEOUS
Qty: 12 ML | Refills: 3 | Status: SHIPPED | OUTPATIENT
Start: 2023-02-08 | End: 2023-02-28 | Stop reason: SDUPTHER

## 2023-02-28 DIAGNOSIS — E78.5 DYSLIPIDEMIA: ICD-10-CM

## 2023-02-28 DIAGNOSIS — E11.9 CONTROLLED TYPE 2 DIABETES MELLITUS WITHOUT COMPLICATION, WITHOUT LONG-TERM CURRENT USE OF INSULIN (HCC): ICD-10-CM

## 2023-02-28 RX ORDER — DULAGLUTIDE 1.5 MG/.5ML
1 INJECTION, SOLUTION SUBCUTANEOUS
Qty: 12 ML | Refills: 3 | Status: SHIPPED | OUTPATIENT
Start: 2023-02-28 | End: 2023-03-23 | Stop reason: SDUPTHER

## 2023-02-28 RX ORDER — ATORVASTATIN CALCIUM 10 MG/1
10 TABLET, FILM COATED ORAL
Qty: 90 TABLET | Refills: 3 | Status: SHIPPED | OUTPATIENT
Start: 2023-02-28 | End: 2023-05-24 | Stop reason: SDUPTHER

## 2023-02-28 RX ORDER — LANCETS 33 GAUGE
EACH MISCELLANEOUS
Qty: 100 EACH | Refills: 3 | Status: SHIPPED | OUTPATIENT
Start: 2023-02-28 | End: 2023-03-23 | Stop reason: SDUPTHER

## 2023-03-23 DIAGNOSIS — E11.9 CONTROLLED TYPE 2 DIABETES MELLITUS WITHOUT COMPLICATION, WITHOUT LONG-TERM CURRENT USE OF INSULIN (HCC): ICD-10-CM

## 2023-03-23 RX ORDER — DULAGLUTIDE 1.5 MG/.5ML
1 INJECTION, SOLUTION SUBCUTANEOUS
Qty: 12 ML | Refills: 3 | Status: SHIPPED | OUTPATIENT
Start: 2023-03-23 | End: 2023-04-19 | Stop reason: SDUPTHER

## 2023-03-23 RX ORDER — LANCETS 33 GAUGE
EACH MISCELLANEOUS
Qty: 100 EACH | Refills: 3 | Status: SHIPPED | OUTPATIENT
Start: 2023-03-23

## 2023-03-23 NOTE — TELEPHONE ENCOUNTER
Received request via: Patient    Was the patient seen in the last year in this department? Yes    Does the patient have an active prescription (recently filled or refills available) for medication(s) requested? No    Does the patient have residential Plus and need 100 day supply (blood pressure, diabetes and cholesterol meds only)? Patient does not have SCP

## 2023-03-23 NOTE — TELEPHONE ENCOUNTER
1. Name: Arvind Jesus      Call Back Number: [phone] (home)         How would the patient prefer to be contacted with a response: Phone call OK to leave a detailed message    Pt Arvind called in requesting refills on:     - Dulaglutide (TRULICITY) 1.5 MG/0.5ML Solution Pen    - Lancets (ONETOUCH DELICA PLUS [id number])     Please call him and let him know when filled ([place] Speaker).    Thanks

## 2023-04-11 RX ORDER — AMLODIPINE BESYLATE 5 MG/1
5 TABLET ORAL
Qty: 90 TABLET | Refills: 3 | Status: SHIPPED | OUTPATIENT
Start: 2023-04-11

## 2023-04-11 NOTE — TELEPHONE ENCOUNTER
1. Name: Arvind Weinbergjimmy Jesus'      Call Back Number: [phone] (home)         How would the patient prefer to be contacted with a response: Phone call OK to leave a detailed message    Pt Arvind called in LVM saying he needed refill on Blood pressure meds sent to [facility].    Thank you

## 2023-04-12 ENCOUNTER — TELEPHONE (OUTPATIENT)
Dept: MEDICAL GROUP | Facility: MEDICAL CENTER | Age: 48
End: 2023-04-12

## 2023-04-12 NOTE — TELEPHONE ENCOUNTER
1. Name: Arvind Jesus      Call Back Number: [phone] (home)         How would the patient prefer to be contacted with a response: Phone call OK to leave a detailed message    Pt Arvind DHILLON saying he needed A refill on his BP meds.     Thank you

## 2023-04-19 DIAGNOSIS — E11.9 CONTROLLED TYPE 2 DIABETES MELLITUS WITHOUT COMPLICATION, WITHOUT LONG-TERM CURRENT USE OF INSULIN (HCC): ICD-10-CM

## 2023-05-23 ENCOUNTER — TELEPHONE (OUTPATIENT)
Dept: MEDICAL GROUP | Facility: MEDICAL CENTER | Age: 48
End: 2023-05-23

## 2023-05-23 NOTE — TELEPHONE ENCOUNTER
1. Name: Arvind Jesus      Call Back Number: [phone] (home)         How would the patient prefer to be contacted with a response: Phone call OK to leave a detailed message    Pt Arvind called in and LVM saying he needed some refills and needs MA to call him [place] speaker.    Thank you

## 2023-05-24 DIAGNOSIS — E78.5 DYSLIPIDEMIA: ICD-10-CM

## 2023-05-24 DIAGNOSIS — E11.9 CONTROLLED TYPE 2 DIABETES MELLITUS WITHOUT COMPLICATION, WITHOUT LONG-TERM CURRENT USE OF INSULIN (HCC): ICD-10-CM

## 2023-05-24 RX ORDER — ATORVASTATIN CALCIUM 10 MG/1
10 TABLET, FILM COATED ORAL
Qty: 90 TABLET | Refills: 3 | Status: SHIPPED | OUTPATIENT
Start: 2023-05-24 | End: 2024-02-02 | Stop reason: SDUPTHER

## 2023-06-14 ENCOUNTER — OFFICE VISIT (OUTPATIENT)
Dept: MEDICAL GROUP | Facility: MEDICAL CENTER | Age: 48
End: 2023-06-14
Payer: COMMERCIAL

## 2023-06-14 VITALS
TEMPERATURE: 98 F | DIASTOLIC BLOOD PRESSURE: 86 MMHG | HEART RATE: 64 BPM | OXYGEN SATURATION: 95 % | HEIGHT: 69 IN | BODY MASS INDEX: 35.72 KG/M2 | SYSTOLIC BLOOD PRESSURE: 138 MMHG | WEIGHT: 241.18 LBS

## 2023-06-14 DIAGNOSIS — B35.1 ONYCHOMYCOSIS OF RIGHT GREAT TOE: ICD-10-CM

## 2023-06-14 DIAGNOSIS — E78.5 DYSLIPIDEMIA: ICD-10-CM

## 2023-06-14 DIAGNOSIS — E11.9 CONTROLLED TYPE 2 DIABETES MELLITUS WITHOUT COMPLICATION, WITHOUT LONG-TERM CURRENT USE OF INSULIN (HCC): ICD-10-CM

## 2023-06-14 DIAGNOSIS — I10 ESSENTIAL HYPERTENSION: ICD-10-CM

## 2023-06-14 LAB
HBA1C MFR BLD: 6 % (ref ?–5.8)
POCT INT CON NEG: NEGATIVE
POCT INT CON POS: POSITIVE

## 2023-06-14 PROCEDURE — 3079F DIAST BP 80-89 MM HG: CPT | Performed by: STUDENT IN AN ORGANIZED HEALTH CARE EDUCATION/TRAINING PROGRAM

## 2023-06-14 PROCEDURE — 99214 OFFICE O/P EST MOD 30 MIN: CPT | Performed by: STUDENT IN AN ORGANIZED HEALTH CARE EDUCATION/TRAINING PROGRAM

## 2023-06-14 PROCEDURE — 83036 HEMOGLOBIN GLYCOSYLATED A1C: CPT | Performed by: STUDENT IN AN ORGANIZED HEALTH CARE EDUCATION/TRAINING PROGRAM

## 2023-06-14 PROCEDURE — 3075F SYST BP GE 130 - 139MM HG: CPT | Performed by: STUDENT IN AN ORGANIZED HEALTH CARE EDUCATION/TRAINING PROGRAM

## 2023-06-14 RX ORDER — DULAGLUTIDE 1.5 MG/.5ML
INJECTION, SOLUTION SUBCUTANEOUS
COMMUNITY
Start: 2023-05-08 | End: 2023-06-14

## 2023-06-14 RX ORDER — CICLOPIROX 80 MG/ML
SOLUTION TOPICAL
Qty: 6 ML | Refills: 2 | Status: SHIPPED | OUTPATIENT
Start: 2023-06-14 | End: 2024-01-19

## 2023-06-14 ASSESSMENT — FIBROSIS 4 INDEX: FIB4 SCORE: 0.8

## 2023-06-14 NOTE — PROGRESS NOTES
Subjective:     CC: diabetes follow up    HPI:   Arvind presents today for diabetes follow up    Problem   Onychomycosis of Right Great Toe    Chronic condition. Has been having thickened toenail on right great toenail for many years. Would like to trial medication.     Dyslipidemia    Chronic condition.  Current regimen: atorvastatin 10mg qd   He reports compliance and tolerating medication well. Denies musculoskeletal pain, headache, diarrhea. He does not need medication refill today. No acute concerns at this time.       Controlled Type 2 Diabetes Mellitus Without Complication, Without Long-Term Current Use of Insulin ([facility])    Chronic condition. Onset 05/2020. AYAH and insulin Ab are both negative. Followed by endocrinology every 6 months. Since last visit, he has discontinued Trulicity due to insurance coverage and has been taking metformin 850mg two times daily.    Current medication regimen: metformin 850mg BID    Patient tolerating and reports compliant with medications. Does not need refill of medications today. Denies vision changes, dry mouth, chest pain, nausea/vomiting/diarrhea, polydipsia, polyphagia, polyuria, hypoglycemia, numbness/tingling. He checks his feet at home.  Last eye exam: done 08/2021  Last foot exam: done 05/2022  Diet: tries to eat healthy in general  Exercise: regular exercise  Vaccines: flu due, PPSV23 06/2020, Tdap 06/2020     Essential Hypertension    Chronic condition.  Current medication regimen: amlodipine 5mg qd  Patient tolerating and reports compliant with medications. He does not regularly monitor blood pressure at home. Does not need refill of medications today. Denies headache, dizziness, vision changes, chest pain, dyspnea, edema.           Current Outpatient Medications Ordered in Epic   Medication Sig Dispense Refill    ciclopirox (PENLAC) 8 % solution Apply once daily to affected nails until nail clearance or up to 48 weeks. 6 mL 2    metFORMIN (GLUCOPHAGE) 850 MG Tab Take  "1 Tablet by mouth 2 times a day with meals for 360 days. 180 Tablet 3    atorvastatin (LIPITOR) 10 MG Tab Take 1 Tablet by mouth every day. 90 Tablet 3    amLODIPine (NORVASC) 5 MG Tab Take 1 Tablet by mouth every day. 90 Tablet 3    Lancets (ONETOUCH DELICA PLUS [id number]) Misc USE TO TEST BLOOD SUGAR ONCE A DAY IN EARLY MORNING BEFORE FIRST MEAL 100 Each 3    ONETOUCH VERIO strip USE TO TEST BLOOD SUGAR ONCE A DAY IN EARLY MORNING BEFORE FIRST MEAL      ketoconazole (NIZORAL) 2 % Cream Apply 1 Application topically every day. 3 g 2    vitamin D (CHOLECALCIFEROL) 1000 Unit (25 mcg) Tab Take 2,000 Units by mouth every day.      Alcohol Swabs Wipe site with prep pad prior to injection. 100 Each 2    Blood Glucose Monitoring Suppl (ONETOUCH VERIO) w/Device Kit USE TO TEST BLOOD SUGAR ONCE A DAY IN EARLY MORNING BEFORE FIRST MEAL      Blood Glucose Meter Kit Test blood sugar as recommended by provider.Insurance preferred blood glucose monitoring kit. 1 Kit 0     No current Epic-ordered facility-administered medications on file.     ROS:  See HPI    Objective:     Exam:  /86 (BP Location: Left arm, Patient Position: Sitting, BP Cuff Size: Large adult)   Pulse 64   Temp 36.7 °C (98 °F) (Temporal)   Ht 1.753 m (5' 9\")   Wt 109 kg (241 lb 2.9 oz)   SpO2 95%   BMI 35.62 kg/m²  Body mass index is 35.62 kg/m².    Gen: Alert and oriented, No apparent distress.  Neck: Neck is supple without lymphadenopathy.  Lungs: Normal effort, CTA bilaterally, no wheezes, rhonchi, or rales  CV: Regular rate and rhythm. No murmurs, rubs, or gallops.  Ext: No clubbing, cyanosis, edema.    Monofilament testing with a 10 gram force: sensation intact: intact bilaterally  Visual Inspection: Feet without maceration, ulcers, fissures.  Pedal pulses: intact bilaterally    Labs: no new lab results since last visit    Assessment & Plan:     47 y.o. male with the following -     1. Controlled type 2 diabetes mellitus without complication, " without long-term current use of insulin (HCC)  Chronic condition, controlled with medications. Most recent A1C 6.0.  - cont current medication: metformin 850mg BID  - Discussed daily self foot exam, eye care, and regular exercise with patient  - Patient instructed to follow a low carbohydrate diet  - Follow up in 3 months or earlier as needed  - POCT Hemoglobin A1C  - Diabetic Monofilament LE Exam  - metFORMIN (GLUCOPHAGE) 850 MG Tab; Take 1 Tablet by mouth 2 times a day with meals for 360 days.  Dispense: 180 Tablet; Refill: 3    2. Essential hypertension  Chronic condition, stable.  - cont current regimen: amlodipine 5mg daily    3. Dyslipidemia  Chronic condition, stable.  - cont current regimen: atorvastatin 10mg daily    4. Onychomycosis of right great toe  Chronic condition, persistent.  - Rx Penlac solution per order  - ciclopirox (PENLAC) 8 % solution; Apply once daily to affected nails until nail clearance or up to 48 weeks.  Dispense: 6 mL; Refill: 2      Return in about 6 months (around 12/14/2023) for chronic medical conditions.    Patient is aware that I will be leaving [facility] at the end of September and that he will need to establish with a new primary care provider. It has been a real privilege serving as his family doctor.    Please note that this dictation was created using voice recognition software. I have made every reasonable attempt to correct obvious errors, but I expect that there are errors of grammar and possibly content that I did not discover before finalizing the note.

## 2023-06-15 LAB
25(OH)D3+25(OH)D2 SERPL-MCNC: 37.6 NG/ML (ref 30–100)
ALBUMIN SERPL-MCNC: 4.7 G/DL (ref 4–5)
ALBUMIN/CREAT UR: 5 MG/G CREAT (ref 0–29)
ALBUMIN/GLOB SERPL: 2.2 {RATIO} (ref 1.2–2.2)
ALP BONE CFR SERPL: 61 % (ref 12–68)
ALP BONE SERPL-MCNC: 22.1 UG/L (ref 7.5–26.4)
ALP INTEST CFR SERPL: 14 % (ref 0–18)
ALP LIVER CFR SERPL: 25 % (ref 13–88)
ALP SERPL-CCNC: 103 IU/L (ref 44–121)
ALT SERPL-CCNC: 21 IU/L (ref 0–44)
AST SERPL-CCNC: 17 IU/L (ref 0–40)
BILIRUB SERPL-MCNC: 1.1 MG/DL (ref 0–1.2)
BUN SERPL-MCNC: 11 MG/DL (ref 6–24)
BUN/CREAT SERPL: 14 (ref 9–20)
CALCIUM SERPL-MCNC: 8.9 MG/DL (ref 8.7–10.2)
CHLORIDE SERPL-SCNC: 103 MMOL/L (ref 96–106)
CHOLEST SERPL-MCNC: 100 MG/DL (ref 100–199)
CO2 SERPL-SCNC: 24 MMOL/L (ref 20–29)
CREAT SERPL-MCNC: 0.81 MG/DL (ref 0.76–1.27)
CREAT UR-MCNC: 142 MG/DL
EGFRCR SERPLBLD CKD-EPI 2021: 109 ML/MIN/1.73
GLOBULIN SER CALC-MCNC: 2.1 G/DL (ref 1.5–4.5)
GLUCOSE SERPL-MCNC: 110 MG/DL (ref 70–99)
HBV SURFACE AB SER QL: NON REACTIVE
HCV IGG SERPL QL IA: NON REACTIVE
HDLC SERPL-MCNC: 33 MG/DL
LABORATORY COMMENT REPORT: ABNORMAL
LDLC SERPL CALC-MCNC: 53 MG/DL (ref 0–99)
MICROALBUMIN UR-MCNC: 7.5 UG/ML
POTASSIUM SERPL-SCNC: 3.9 MMOL/L (ref 3.5–5.2)
PROT SERPL-MCNC: 6.8 G/DL (ref 6–8.5)
PTH-INTACT SERPL-MCNC: 28 PG/ML (ref 15–65)
SODIUM SERPL-SCNC: 139 MMOL/L (ref 134–144)
TRIGL SERPL-MCNC: 66 MG/DL (ref 0–149)
TSH SERPL DL<=0.005 MIU/L-ACNC: 0.78 UIU/ML (ref 0.45–4.5)
VLDLC SERPL CALC-MCNC: 14 MG/DL (ref 5–40)

## 2023-10-10 ENCOUNTER — HOSPITAL ENCOUNTER (OUTPATIENT)
Facility: MEDICAL CENTER | Age: 48
End: 2023-10-10
Payer: COMMERCIAL

## 2023-10-10 ENCOUNTER — OFFICE VISIT (OUTPATIENT)
Dept: URGENT CARE | Facility: PHYSICIAN GROUP | Age: 48
End: 2023-10-10
Payer: COMMERCIAL

## 2023-10-10 VITALS
RESPIRATION RATE: 16 BRPM | HEART RATE: 85 BPM | DIASTOLIC BLOOD PRESSURE: 86 MMHG | TEMPERATURE: 98.8 F | SYSTOLIC BLOOD PRESSURE: 138 MMHG | BODY MASS INDEX: 33.14 KG/M2 | WEIGHT: 223.77 LBS | OXYGEN SATURATION: 97 % | HEIGHT: 69 IN

## 2023-10-10 DIAGNOSIS — R10.32 LEFT LOWER QUADRANT ABDOMINAL PAIN: ICD-10-CM

## 2023-10-10 LAB
APPEARANCE UR: CLEAR
BILIRUB UR STRIP-MCNC: NEGATIVE MG/DL
COLOR UR AUTO: NORMAL
GLUCOSE UR STRIP.AUTO-MCNC: NEGATIVE MG/DL
KETONES UR STRIP.AUTO-MCNC: NEGATIVE MG/DL
LEUKOCYTE ESTERASE UR QL STRIP.AUTO: NEGATIVE
NITRITE UR QL STRIP.AUTO: NEGATIVE
PH UR STRIP.AUTO: 6 [PH] (ref 5–8)
PROT UR QL STRIP: NORMAL MG/DL
RBC UR QL AUTO: NORMAL
SP GR UR STRIP.AUTO: >=1.03
UROBILINOGEN UR STRIP-MCNC: 0.2 MG/DL

## 2023-10-10 PROCEDURE — 3075F SYST BP GE 130 - 139MM HG: CPT

## 2023-10-10 PROCEDURE — 81002 URINALYSIS NONAUTO W/O SCOPE: CPT

## 2023-10-10 PROCEDURE — 87086 URINE CULTURE/COLONY COUNT: CPT

## 2023-10-10 PROCEDURE — 99213 OFFICE O/P EST LOW 20 MIN: CPT

## 2023-10-10 PROCEDURE — 3079F DIAST BP 80-89 MM HG: CPT

## 2023-10-10 RX ORDER — DULAGLUTIDE 1.5 MG/.5ML
INJECTION, SOLUTION SUBCUTANEOUS
COMMUNITY
Start: 2023-07-28 | End: 2024-02-02 | Stop reason: SDUPTHER

## 2023-10-10 ASSESSMENT — FIBROSIS 4 INDEX: FIB4 SCORE: 0.83

## 2023-10-10 NOTE — PROGRESS NOTES
Chief Complaint   Patient presents with    Abdominal Pain     Abd pain x4 days. Pain is exacerbated with movement. No other GI symptoms.        HISTORY OF PRESENT ILLNESS: Patient is a pleasant 48 y.o. male who presents to urgent care today ongoing abdominal pain for the last 4 days.  Patient denies any nausea or vomiting.  Last bowel movement was this morning noted to be diarrhea.  Patient denies any pain with urination.  He has not taken anything for this.    Patient Active Problem List    Diagnosis Date Noted    Onychomycosis of right great toe 06/14/2023    Preventative health care 06/13/2022    Elevated alkaline phosphatase level 01/14/2021    Long term (current) use of oral hypoglycemic drugs 08/10/2020    Vitamin D deficiency 08/04/2020    Dyslipidemia 07/14/2020    Controlled type 2 diabetes mellitus without complication, without long-term current use of insulin ([facility]) 06/12/2020    Obesity (BMI 30-39.9) 06/12/2020    Essential hypertension 06/12/2020    Tinea pedis of both feet 06/12/2020       Allergies:Patient has no known allergies.    Current Outpatient Medications Ordered in Epic   Medication Sig Dispense Refill    TRULICITY 1.5 MG/0.5ML Solution Pen-injector INJECT 1 ML UNDER THE SKIN EVERY 7 DAYS. 4 PENS PER MONTH      ciclopirox (PENLAC) 8 % solution Apply once daily to affected nails until nail clearance or up to 48 weeks. 6 mL 2    metFORMIN (GLUCOPHAGE) 850 MG Tab Take 1 Tablet by mouth 2 times a day with meals for 360 days. 180 Tablet 3    atorvastatin (LIPITOR) 10 MG Tab Take 1 Tablet by mouth every day. 90 Tablet 3    amLODIPine (NORVASC) 5 MG Tab Take 1 Tablet by mouth every day. 90 Tablet 3    Lancets (ONETOUCH DELICA PLUS [id number]) Misc USE TO TEST BLOOD SUGAR ONCE A DAY IN EARLY MORNING BEFORE FIRST MEAL 100 Each 3    ONETOUCH VERIO strip USE TO TEST BLOOD SUGAR ONCE A DAY IN EARLY MORNING BEFORE FIRST MEAL      ketoconazole (NIZORAL) 2 % Cream Apply 1 Application topically every day. 3 g  2    vitamin D (CHOLECALCIFEROL) 1000 Unit (25 mcg) Tab Take 2,000 Units by mouth every day.      Alcohol Swabs Wipe site with prep pad prior to injection. 100 Each 2    Blood Glucose Monitoring Suppl (ONETOUCH VERIO) w/Device Kit USE TO TEST BLOOD SUGAR ONCE A DAY IN EARLY MORNING BEFORE FIRST MEAL      Blood Glucose Meter Kit Test blood sugar as recommended by provider.Insurance preferred blood glucose monitoring kit. 1 Kit 0     No current Epic-ordered facility-administered medications on file.       Past Medical History:   Diagnosis Date    Diabetes (HCC)     Hypertension        Social History     Tobacco Use    Smoking status: Former     Current packs/day: 0.00     Average packs/day: 0.5 packs/day for 10.0 years (5.0 ttl pk-yrs)     Types: Cigarettes     Start date:      Quit date:      Years since quittin.7    Smokeless tobacco: Never   Vaping Use    Vaping Use: Never used   Substance Use Topics    Alcohol use: Not Currently    Drug use: Never       Family Status   Relation Name Status    Fa  (Not Specified)    Bro  (Not Specified)    Bro  (Not Specified)     Family History   Problem Relation Age of Onset    Hypertension Father     Hypertension Brother     Diabetes Brother     Hypertension Brother     Diabetes Brother        ROS:  Review of Systems   Constitutional: Negative for fever, chills, weight loss, malaise, and fatigue.   HENT: Negative for ear pain, nosebleeds, congestion, sore throat and neck pain.    Eyes: Negative for vision changes.   Neuro: Negative for headache, sensory changes, weakness, seizure, LOC.   Cardiovascular: Negative for chest pain, palpitations, orthopnea and leg swelling.   Respiratory: Negative for cough, sputum production, shortness of breath and wheezing.   Gastrointestinal: Positive for abdominal pain, negative nausea, vomiting and positive diarrhea.   Genitourinary: Negative for dysuria, urgency and frequency.  Musculoskeletal: Negative for falls, neck pain, back  "pain, joint pain, myalgias.   Skin: Negative for rash, diaphoresis.     Exam:  /86 (BP Location: Left arm, Patient Position: Sitting, BP Cuff Size: Adult)   Pulse 85   Temp 37.1 °C (98.8 °F) (Temporal)   Resp 16   Ht 1.753 m (5' 9\")   Wt 101 kg (223 lb 12.3 oz)   SpO2 97%   General: well-nourished, well-developed male in NAD  Head: normocephalic, atraumatic  Eyes: PERRLA, no conjunctival injection, acuity grossly intact, lids normal.  Ears: normal shape and symmetry, no tenderness, no discharge. External canals are without any significant edema or erythema. Tympanic membranes are without any inflammation, no effusion. Gross auditory acuity is intact.  Nose: symmetrical without tenderness, no discharge.  Mouth/Throat: reasonable hygiene, no erythema, exudates or tonsillar enlargement.  Neck: no masses, range of motion within normal limits, no tracheal deviation. No obvious thyroid enlargement.   Lymph: no cervical adenopathy. No supraclavicular adenopathy.   Neuro: alert and oriented. Cranial nerves 1-12 grossly intact. No sensory deficit.   Cardiovascular: regular rate and rhythm. No edema.  Pulmonary: no distress. Chest is symmetrical with respiration, no wheezes, crackles, or rhonchi.   Abdomen: soft, tenderness noted to the left lower quadrant, negative non-tender all other quadrants, no guarding, no hepatosplenomegaly.  Musculoskeletal: no clubbing, appropriate muscle tone, gait is stable.  Skin: warm, dry, intact, no clubbing, no cyanosis, no rashes.   Psych: appropriate mood, affect, judgement.         Assessment/Plan:  1. Left lower quadrant abdominal pain  POCT Urinalysis    URINE CULTURE(NEW)       Patient is a pleasant 48 y.o. male who presents to urgent care today ongoing abdominal pain for the last 4 days.  Patient denies any nausea or vomiting.  Last bowel movement was this morning noted to be diarrhea.  Patient denies any pain with urination.  He has not taken anything for this.  Physical " exam stomach is soft and nontender with the exception to the left lower quadrant which has noted tenderness with palpation.  Normal active bowel sounds.  Negative CVA tenderness.  POCT urinalysis complete to rule out potential causes.  This did show a small amount of blood, no leukocytes or nitrates.  Considerations include diverticulitis.  Encouraged to light diet, Tylenol, Imodium for any ongoing diarrhea.  Patient advised that if his pain continues to get worse, or he develops any other further symptoms please return for consideration of potential imaging and labs.  Patient advised of the plan of care and is agreeable this time, advised to follow-up if he continues to get worse or does not improve.     services utilized for this appointment.    Supportive care, differential diagnoses, and indications for immediate follow-up discussed with patient.   Pathogenesis of diagnosis discussed including typical length and natural progression.   Instructed to return to clinic or nearest emergency department for any change in condition, further concerns, or worsening of symptoms.  Patient states understanding of the plan of care and discharge instructions.  Instructed to make an appointment, for follow up, with primary care provider.        Please note that this dictation was created using voice recognition software. I have made every reasonable attempt to correct obvious errors, but I expect that there are errors of grammar and possibly content that I did not discover before finalizing the note.      Sonam CRUZ

## 2023-10-10 NOTE — LETTER
[facility] [address] [address]     October 10, 2023    Patient: Arvind Jesus   YOB: 1975   Date of Visit: 10/10/2023       To Whom It May Concern:    Arvind Jesus was seen and treated in our department on 10/10/2023.     Sincerely,     DICKSON Ruiz.

## 2023-10-11 DIAGNOSIS — R10.32 LEFT LOWER QUADRANT ABDOMINAL PAIN: ICD-10-CM

## 2023-10-13 LAB
BACTERIA UR CULT: NORMAL
SIGNIFICANT IND 70042: NORMAL
SITE SITE: NORMAL
SOURCE SOURCE: NORMAL

## 2023-10-23 ENCOUNTER — TELEPHONE (OUTPATIENT)
Dept: HEALTH INFORMATION MANAGEMENT | Facility: OTHER | Age: 48
End: 2023-10-23
Payer: COMMERCIAL

## 2024-01-19 ENCOUNTER — OFFICE VISIT (OUTPATIENT)
Dept: MEDICAL GROUP | Age: 49
End: 2024-01-19
Payer: COMMERCIAL

## 2024-01-19 VITALS
OXYGEN SATURATION: 98 % | HEART RATE: 85 BPM | WEIGHT: 245 LBS | SYSTOLIC BLOOD PRESSURE: 144 MMHG | HEIGHT: 69 IN | DIASTOLIC BLOOD PRESSURE: 84 MMHG | TEMPERATURE: 98.2 F | BODY MASS INDEX: 36.29 KG/M2

## 2024-01-19 DIAGNOSIS — B35.1 ONYCHOMYCOSIS OF RIGHT GREAT TOE: ICD-10-CM

## 2024-01-19 DIAGNOSIS — I10 ESSENTIAL HYPERTENSION: ICD-10-CM

## 2024-01-19 DIAGNOSIS — Z12.11 COLON CANCER SCREENING: ICD-10-CM

## 2024-01-19 DIAGNOSIS — E78.5 DYSLIPIDEMIA: ICD-10-CM

## 2024-01-19 DIAGNOSIS — E11.9 CONTROLLED TYPE 2 DIABETES MELLITUS WITHOUT COMPLICATION, WITHOUT LONG-TERM CURRENT USE OF INSULIN (HCC): ICD-10-CM

## 2024-01-19 LAB — RETINAL SCREEN: NEGATIVE

## 2024-01-19 PROCEDURE — 3077F SYST BP >= 140 MM HG: CPT | Performed by: STUDENT IN AN ORGANIZED HEALTH CARE EDUCATION/TRAINING PROGRAM

## 2024-01-19 PROCEDURE — 99214 OFFICE O/P EST MOD 30 MIN: CPT | Performed by: STUDENT IN AN ORGANIZED HEALTH CARE EDUCATION/TRAINING PROGRAM

## 2024-01-19 PROCEDURE — 3079F DIAST BP 80-89 MM HG: CPT | Performed by: STUDENT IN AN ORGANIZED HEALTH CARE EDUCATION/TRAINING PROGRAM

## 2024-01-19 PROCEDURE — 92250 FUNDUS PHOTOGRAPHY W/I&R: CPT | Mod: 26 | Performed by: STUDENT IN AN ORGANIZED HEALTH CARE EDUCATION/TRAINING PROGRAM

## 2024-01-19 ASSESSMENT — ENCOUNTER SYMPTOMS
HEARTBURN: 0
HEADACHES: 0
SENSORY CHANGE: 0
BLURRED VISION: 0
MYALGIAS: 0
ABDOMINAL PAIN: 0
WHEEZING: 0
FEVER: 0
WEAKNESS: 0
SHORTNESS OF BREATH: 0
SPEECH CHANGE: 0
SPUTUM PRODUCTION: 0
DIZZINESS: 0
CHILLS: 0
DOUBLE VISION: 0
BRUISES/BLEEDS EASILY: 0
PALPITATIONS: 0
ORTHOPNEA: 0
BLOOD IN STOOL: 0
NECK PAIN: 0
NAUSEA: 0
DEPRESSION: 0
PHOTOPHOBIA: 0

## 2024-01-19 ASSESSMENT — PATIENT HEALTH QUESTIONNAIRE - PHQ9: CLINICAL INTERPRETATION OF PHQ2 SCORE: 0

## 2024-01-19 ASSESSMENT — FIBROSIS 4 INDEX: FIB4 SCORE: 0.83

## 2024-01-19 NOTE — PROGRESS NOTES
"Subjective:     CC: Establish care    HPI:   Arvind presents today to re-establish care his previous PCP left practice.  He has a PMHX of DM type well controlled, HTN, DLD.  Most recent lab work last summer with excellent A1c:6.0% on Metformin and trulicity.  Today he states feeling well but would like to be treated for Onychomycosis of his right big toe, he has failed topical treatments. I suggested to repeat CMP first and then we can consider oral therapy and he can also do his routine DM lab work done at the same time and follow up within 2 weeks.    Health Maintenance: Completed    ROS:  Review of Systems   Constitutional:  Negative for chills, fever and malaise/fatigue.   HENT:  Negative for nosebleeds and tinnitus.    Eyes:  Negative for blurred vision, double vision and photophobia.   Respiratory:  Negative for sputum production, shortness of breath and wheezing.    Cardiovascular:  Negative for chest pain, palpitations, orthopnea and leg swelling.   Gastrointestinal:  Negative for abdominal pain, blood in stool, heartburn, melena and nausea.   Genitourinary:  Negative for dysuria, frequency and urgency.   Musculoskeletal:  Negative for myalgias and neck pain.   Neurological:  Negative for dizziness, sensory change, speech change, weakness and headaches.   Endo/Heme/Allergies:  Does not bruise/bleed easily.   Psychiatric/Behavioral:  Negative for depression and suicidal ideas.        Objective:     Exam:  BP (!) 144/84 (BP Location: Left arm, Patient Position: Sitting, BP Cuff Size: Adult long)   Pulse 85   Temp 36.8 °C (98.2 °F) (Temporal)   Ht 1.753 m (5' 9\")   Wt 111 kg (245 lb)   SpO2 98%   BMI 36.18 kg/m²  Body mass index is 36.18 kg/m².    Physical Exam  Vitals reviewed.   Constitutional:       General: He is not in acute distress.  HENT:      Head: Normocephalic and atraumatic.      Right Ear: Tympanic membrane and ear canal normal.      Left Ear: Tympanic membrane and ear canal normal.      Nose: " No congestion.      Mouth/Throat:      Mouth: Mucous membranes are moist.      Pharynx: No oropharyngeal exudate or posterior oropharyngeal erythema.   Eyes:      General: No scleral icterus.     Extraocular Movements: Extraocular movements intact.      Pupils: Pupils are equal, round, and reactive to light.   Cardiovascular:      Rate and Rhythm: Normal rate and regular rhythm.      Pulses: Normal pulses.      Heart sounds: Normal heart sounds. No murmur heard.  Pulmonary:      Effort: Pulmonary effort is normal. No respiratory distress.      Breath sounds: Normal breath sounds. No wheezing.   Abdominal:      General: Bowel sounds are normal. There is no distension.      Palpations: Abdomen is soft.      Tenderness: There is no abdominal tenderness. There is no guarding or rebound.   Musculoskeletal:      Cervical back: Normal range of motion and neck supple.      Right lower leg: No edema.      Left lower leg: No edema.      Comments: Monofilament testing with a 10 gram force: sensation intact: intact bilaterally  Visual Inspection: Feet without maceration, ulcers, fissures.  Pedal pulses: intact bilaterally    Skin:     General: Skin is warm.      Capillary Refill: Capillary refill takes less than 2 seconds.      Findings: No bruising or erythema.   Neurological:      General: No focal deficit present.      Mental Status: He is alert.      Cranial Nerves: No cranial nerve deficit.      Sensory: No sensory deficit.   Psychiatric:         Mood and Affect: Mood normal.         A chaperone was offered to the patient during today's exam.: Patient declined.    Labs: Ordered     Assessment & Plan:     48 y.o. male with the following -     1. Controlled type 2 diabetes mellitus without complication, without long-term current use of insulin (HCC)  - Chronic, stable  - On metformin and trulicity  - A1c: 6.0% last summer  - EYE exam today POCT  - Sent for routine lab work  - Normal Monofilament test    - Hemoglobin A1C;  Future  - POCT Retinal Eye Exam  - MICROALBUMIN CREAT RATIO URINE; Future  - VITAMIN B12; Future  - Diabetic Monofilament LE Exam    2. Essential hypertension  - Chronic, stable  - On norvas 5 mg daily  - BP today mildly elevated but normal at home  - CTM BP    - Comp Metabolic Panel; Future    3. Dyslipidemia  - Chronic, stable  - Las summer normal Lipid profile  - Continue lipitor 10 mg daily    - Lipid Profile; Future    4. Onychomycosis of right great toe  - Chronic problem   - Failed topical therapies  - CMP prior start Therapy    5. Colon cancer screening  - Due     - Cologuard Colon Cancer Screening           Return in about 2 weeks (around 2/2/2024) for Labs.    Please note that this dictation was created using voice recognition software. I have made every reasonable attempt to correct obvious errors, but I expect that there are errors of grammar and possibly content that I did not discover before finalizing the note.

## 2024-01-23 LAB
ALBUMIN SERPL-MCNC: 4.5 G/DL (ref 4.1–5.1)
ALBUMIN/CREAT UR: 11 MG/G CREAT (ref 0–29)
ALBUMIN/GLOB SERPL: 2.3 {RATIO} (ref 1.2–2.2)
ALP SERPL-CCNC: 113 IU/L (ref 44–121)
ALT SERPL-CCNC: 19 IU/L (ref 0–44)
AST SERPL-CCNC: 15 IU/L (ref 0–40)
BILIRUB SERPL-MCNC: 0.2 MG/DL (ref 0–1.2)
BUN SERPL-MCNC: 13 MG/DL (ref 6–24)
BUN/CREAT SERPL: 16 (ref 9–20)
CALCIUM SERPL-MCNC: 8.8 MG/DL (ref 8.7–10.2)
CHLORIDE SERPL-SCNC: 105 MMOL/L (ref 96–106)
CHOLEST SERPL-MCNC: 101 MG/DL (ref 100–199)
CO2 SERPL-SCNC: 22 MMOL/L (ref 20–29)
CREAT SERPL-MCNC: 0.79 MG/DL (ref 0.76–1.27)
CREAT UR-MCNC: 140.9 MG/DL
EGFRCR SERPLBLD CKD-EPI 2021: 109 ML/MIN/1.73
GLOBULIN SER CALC-MCNC: 2 G/DL (ref 1.5–4.5)
GLUCOSE SERPL-MCNC: 118 MG/DL (ref 70–99)
HBA1C MFR BLD: 6.7 % (ref 4.8–5.6)
HDLC SERPL-MCNC: 35 MG/DL
LABORATORY COMMENT REPORT: ABNORMAL
LDLC SERPL CALC-MCNC: 52 MG/DL (ref 0–99)
MICROALBUMIN UR-MCNC: 15.5 UG/ML
POTASSIUM SERPL-SCNC: 3.7 MMOL/L (ref 3.5–5.2)
PROT SERPL-MCNC: 6.5 G/DL (ref 6–8.5)
SODIUM SERPL-SCNC: 142 MMOL/L (ref 134–144)
TRIGL SERPL-MCNC: 65 MG/DL (ref 0–149)
VIT B12 SERPL-MCNC: 248 PG/ML (ref 232–1245)
VLDLC SERPL CALC-MCNC: 14 MG/DL (ref 5–40)

## 2024-02-02 ENCOUNTER — OFFICE VISIT (OUTPATIENT)
Dept: MEDICAL GROUP | Age: 49
End: 2024-02-02
Payer: COMMERCIAL

## 2024-02-02 VITALS
SYSTOLIC BLOOD PRESSURE: 126 MMHG | WEIGHT: 247 LBS | HEIGHT: 69 IN | DIASTOLIC BLOOD PRESSURE: 80 MMHG | TEMPERATURE: 97.8 F | HEART RATE: 79 BPM | OXYGEN SATURATION: 98 % | BODY MASS INDEX: 36.58 KG/M2

## 2024-02-02 DIAGNOSIS — E11.9 CONTROLLED TYPE 2 DIABETES MELLITUS WITHOUT COMPLICATION, WITHOUT LONG-TERM CURRENT USE OF INSULIN (HCC): ICD-10-CM

## 2024-02-02 DIAGNOSIS — I10 ESSENTIAL HYPERTENSION: ICD-10-CM

## 2024-02-02 DIAGNOSIS — E53.8 B12 NUTRITIONAL DEFICIENCY: ICD-10-CM

## 2024-02-02 DIAGNOSIS — E78.5 DYSLIPIDEMIA: ICD-10-CM

## 2024-02-02 PROCEDURE — 3074F SYST BP LT 130 MM HG: CPT | Performed by: STUDENT IN AN ORGANIZED HEALTH CARE EDUCATION/TRAINING PROGRAM

## 2024-02-02 PROCEDURE — 3079F DIAST BP 80-89 MM HG: CPT | Performed by: STUDENT IN AN ORGANIZED HEALTH CARE EDUCATION/TRAINING PROGRAM

## 2024-02-02 PROCEDURE — 99214 OFFICE O/P EST MOD 30 MIN: CPT | Performed by: STUDENT IN AN ORGANIZED HEALTH CARE EDUCATION/TRAINING PROGRAM

## 2024-02-02 RX ORDER — DULAGLUTIDE 1.5 MG/.5ML
INJECTION, SOLUTION SUBCUTANEOUS
Qty: 2.24 ML | Refills: 3 | Status: SHIPPED | OUTPATIENT
Start: 2024-02-02 | End: 2024-03-18

## 2024-02-02 RX ORDER — ATORVASTATIN CALCIUM 10 MG/1
10 TABLET, FILM COATED ORAL
Qty: 90 TABLET | Refills: 3 | Status: SHIPPED | OUTPATIENT
Start: 2024-02-02

## 2024-02-02 ASSESSMENT — FIBROSIS 4 INDEX: FIB4 SCORE: 0.77

## 2024-02-02 ASSESSMENT — ENCOUNTER SYMPTOMS
HEADACHES: 0
ABDOMINAL PAIN: 0
BLOOD IN STOOL: 0
DEPRESSION: 0
HEARTBURN: 0
BACK PAIN: 0
SHORTNESS OF BREATH: 0
CHILLS: 0
PALPITATIONS: 0
BRUISES/BLEEDS EASILY: 0
BLURRED VISION: 0
DIZZINESS: 0
FEVER: 0
DOUBLE VISION: 0
WEAKNESS: 0
COUGH: 0
NECK PAIN: 0
ORTHOPNEA: 0
WHEEZING: 0

## 2024-02-02 NOTE — PROGRESS NOTES
"Subjective:     CC: Follow up labs    HPI:   Arvind presents today for follow up lab work.  Established care with me 2 weeks ago he has well controlled DM and HTN.  CMP unremarkable besides mildly elevated B, but normal Kidney and Liver function. A1c: 6.7%.  Lipid profile just borderline low HDL but normal LDL:52.  Unire micro no proteinuria, B12: 248 in the low normal limit might benefit from supplementation.  Retinal exam Normal POCT done at initial encounter.  Otherwise today states feeling well no complaints at all.    Health Maintenance: Completed    ROS:  Review of Systems   Constitutional:  Negative for chills, fever and malaise/fatigue.   HENT:  Negative for nosebleeds and tinnitus.    Eyes:  Negative for blurred vision and double vision.   Respiratory:  Negative for cough, shortness of breath and wheezing.    Cardiovascular:  Negative for chest pain, palpitations, orthopnea and leg swelling.   Gastrointestinal:  Negative for abdominal pain, blood in stool, heartburn and melena.   Genitourinary:  Negative for dysuria and urgency.   Musculoskeletal:  Negative for back pain, joint pain and neck pain.   Neurological:  Negative for dizziness, weakness and headaches.   Endo/Heme/Allergies:  Does not bruise/bleed easily.   Psychiatric/Behavioral:  Negative for depression and suicidal ideas.        Objective:     Exam:  /80 (BP Location: Left arm, Patient Position: Sitting, BP Cuff Size: Large adult)   Pulse 79   Temp 36.6 °C (97.8 °F) (Temporal)   Ht 1.753 m (5' 9\")   Wt 112 kg (247 lb)   SpO2 98%   BMI 36.48 kg/m²  Body mass index is 36.48 kg/m².    Physical Exam  Vitals reviewed.   Constitutional:       General: He is not in acute distress.  HENT:      Head: Normocephalic and atraumatic.      Nose: No congestion.      Mouth/Throat:      Mouth: Mucous membranes are moist.      Pharynx: No oropharyngeal exudate or posterior oropharyngeal erythema.   Eyes:      General: No scleral icterus.     " Pupils: Pupils are equal, round, and reactive to light.   Cardiovascular:      Rate and Rhythm: Normal rate and regular rhythm.      Pulses: Normal pulses.      Heart sounds: Normal heart sounds. No murmur heard.  Pulmonary:      Effort: Pulmonary effort is normal. No respiratory distress.      Breath sounds: Normal breath sounds. No wheezing.   Abdominal:      General: Bowel sounds are normal. There is no distension.      Palpations: Abdomen is soft.      Tenderness: There is no abdominal tenderness. There is no guarding or rebound.   Musculoskeletal:         General: No swelling. Normal range of motion.      Cervical back: Normal range of motion and neck supple. No rigidity.      Right lower leg: No edema.      Left lower leg: No edema.      Comments: Monofilament testing with a 10 gram force: sensation intact: intact bilaterally  Visual Inspection: Feet without maceration, ulcers, fissures.  Pedal pulses: intact bilaterally    Lymphadenopathy:      Cervical: No cervical adenopathy.   Skin:     General: Skin is warm.      Capillary Refill: Capillary refill takes less than 2 seconds.      Findings: No bruising or erythema.   Neurological:      General: No focal deficit present.      Mental Status: He is alert.      Cranial Nerves: No cranial nerve deficit.      Sensory: No sensory deficit.   Psychiatric:         Mood and Affect: Mood normal.         Behavior: Behavior normal.         A chaperone was offered to the patient during today's exam.: Patient declined.    Labs: Reviewed     Assessment & Plan:     48 y.o. male with the following -     1. Controlled type 2 diabetes mellitus without complication, without long-term current use of insulin ([facility])  - Chronic, well controlled  - A1c: 6.7%  - B12 Low normal  - Normal Retinal exam  - Monofilament testing with a 10 gram force: sensation intact: intact bilaterally  Visual Inspection: Feet without maceration, ulcers, fissures.  Pedal pulses: intact bilaterally   -  Continue same therapy  - Repeat labs in 3-4 months     - atorvastatin (LIPITOR) 10 MG Tab; Take 1 Tablet by mouth every day.  Dispense: 90 Tablet; Refill: 3  - HEMOGLOBIN A1C; Future  - VITAMIN B12; Future    2. Essential hypertension  - Chronic, well controlled  - Continue Norvasc  - Normal CMP    3. Dyslipidemia  -  Chronic, well controlled  - Continue Lipitor  - Lipid profile at target     - atorvastatin (LIPITOR) 10 MG Tab; Take 1 Tablet by mouth every day.  Dispense: 90 Tablet; Refill: 3    4. B12 nutritional deficiency  - Borderline normal B12: 248  - On Metformin for DM  - Likely sec to Metformin  - Supplementation for 2 months    - cyanocobalamin (VITAMIN B12) 1000 MCG Tab; Take 1 Tablet by mouth every day.  Dispense: 30 Tablet; Refill: 1  - VITAMIN B12; Future  - CBC WITH DIFFERENTIAL; Future     Return in about 3 months (around 5/9/2024) for Labs, Annual.    Please note that this dictation was created using voice recognition software. I have made every reasonable attempt to correct obvious errors, but I expect that there are errors of grammar and possibly content that I did not discover before finalizing the note.

## 2024-03-18 ENCOUNTER — TELEPHONE (OUTPATIENT)
Dept: MEDICAL GROUP | Age: 49
End: 2024-03-18
Payer: COMMERCIAL

## 2024-03-18 NOTE — TELEPHONE ENCOUNTER
Caller Name: AMERICA  Call Back Number: [phone] (home)       How would the patient prefer to be contacted with a response: Phone call OK to leave a detailed message    PATIENTS DAUGHTER CALLED STATING THAT PHARMACY CAN'T FILL HER DADS TRULICTY. DAUGHTER WOULD LIKE ANOTHER OPTION. SOMETHING WAS SENT 03/16/2024

## 2024-03-18 NOTE — ASSESSMENT & PLAN NOTE
Assessment/Plan:         Problem List Items Addressed This Visit    None  Visit Diagnoses     Flu-like symptoms    -  Primary    rapid flu/covid, increase fluids, zofran for nausea.    Relevant Medications    ondansetron (ZOFRAN) 4 mg tablet    Other Relevant Orders    POCT rapid flu A and B (Completed)    POCT rapid strep A (Completed)    Vitamin D deficiency        Will repeat lab work and call with results    Relevant Orders    Vitamin D 25 hydroxy    Screening for cardiovascular condition        Will repeat lab work and call with results    Relevant Orders    CBC and differential    Comprehensive metabolic panel              Subjective:      Patient ID: Thelma Berry is a 12 y.o. female.    Sore Throat  Associated symptoms include arthralgias, chills, congestion, diaphoresis, fatigue, a fever, headaches, nausea, a sore throat and vomiting. Pertinent negatives include no abdominal pain, chest pain, coughing or rash.   Fever  Associated symptoms include arthralgias, chills, congestion, diaphoresis, fatigue, a fever, headaches, nausea, a sore throat and vomiting. Pertinent negatives include no abdominal pain, chest pain, coughing or rash.       The following portions of the patient's history were reviewed and updated as appropriate:   Past Medical History:  She has a past medical history of Premature baby.,  _______________________________________________________________________  Medical Problems:  does not have any pertinent problems on file.,  _______________________________________________________________________  Past Surgical History:   has no past surgical history on file.,  _______________________________________________________________________  Family History:  family history includes Hyperlipidemia in her paternal grandmother; Hypertension in her mother and paternal grandmother; Ovarian cancer in her maternal grandmother; Skin cancer in her maternal grandmother; Thyroid disease unspecified in her paternal  Body mass index is 33.28 kg/m².  Lifestyle modifications       grandmother.,  _______________________________________________________________________  Social History:   reports that she has never smoked. She does not have any smokeless tobacco history on file. She reports current alcohol use. She reports that she does not use drugs.,  _______________________________________________________________________  Allergies:  has No Known Allergies..  _______________________________________________________________________  Current Outpatient Medications   Medication Sig Dispense Refill   • albuterol (Ventolin HFA) 90 mcg/act inhaler Inhale 2 puffs every 6 (six) hours as needed for wheezing 18 g 5   • clotrimazole (LOTRIMIN) 1 % cream Apply topically 2 (two) times a day 28 g 1   • clotrimazole-betamethasone (LOTRISONE) 1-0.05 % cream Apply topically 2 (two) times a day 45 g 1   • fluticasone (FLONASE) 50 mcg/act nasal spray SPRAY 1 SPRAY INTO EACH NOSTRIL EVERY DAY 48 mL 1   • levocetirizine (XYZAL) 5 MG tablet Take 1 tablet (5 mg total) by mouth every evening 90 tablet 1   • montelukast (SINGULAIR) 5 mg chewable tablet CHEW 1 TABLET (5 MG TOTAL) DAILY AT BEDTIME 90 tablet 0   • ondansetron (ZOFRAN) 4 mg tablet Take 1 tablet (4 mg total) by mouth every 8 (eight) hours as needed for nausea or vomiting 20 tablet 0   • triamcinolone (KENALOG) 0.1 % cream Apply topically 2 (two) times a day 30 g 0   • nystatin-triamcinolone (MYCOLOG-II) ointment Apply topically 2 (two) times a day for 14 days 30 g 1     No current facility-administered medications for this visit.     _______________________________________________________________________  Review of Systems   Constitutional:  Positive for appetite change, chills, diaphoresis, fatigue and fever.   HENT:  Positive for congestion, rhinorrhea, sinus pressure, sinus pain and sore throat. Negative for ear pain.    Eyes:  Negative for pain and visual disturbance.   Respiratory:  Negative for cough, chest tightness, shortness of breath and wheezing.  "   Cardiovascular:  Negative for chest pain and palpitations.   Gastrointestinal:  Positive for nausea and vomiting. Negative for abdominal pain and diarrhea.   Musculoskeletal:  Positive for arthralgias.   Skin:  Negative for color change and rash.   Neurological:  Positive for headaches. Negative for seizures and syncope.   All other systems reviewed and are negative.        Objective:  Vitals:    03/18/24 0841   BP: (!) 96/68   BP Location: Left arm   Patient Position: Sitting   Cuff Size: Standard   Pulse: 100   Resp: 16   Temp: 98.6 °F (37 °C)   TempSrc: Tympanic   SpO2: 99%   Weight: 49.4 kg (109 lb)   Height: 5' 1\" (1.549 m)     Body mass index is 20.6 kg/m².     Physical Exam  Vitals and nursing note reviewed.   Constitutional:       General: She is not in acute distress.     Appearance: Normal appearance. She is ill-appearing.   HENT:      Head: Normocephalic.      Right Ear: External ear normal. There is impacted cerumen.      Left Ear: External ear normal. There is impacted cerumen.      Nose: Congestion present.      Mouth/Throat:      Mouth: Mucous membranes are moist.      Pharynx: Posterior oropharyngeal erythema present.   Eyes:      Extraocular Movements: Extraocular movements intact.      Conjunctiva/sclera: Conjunctivae normal.      Pupils: Pupils are equal, round, and reactive to light.   Cardiovascular:      Rate and Rhythm: Normal rate and regular rhythm.      Pulses: Normal pulses.      Heart sounds: Normal heart sounds.   Pulmonary:      Effort: Pulmonary effort is normal. No respiratory distress.      Breath sounds: Normal breath sounds. No wheezing.   Abdominal:      General: Bowel sounds are normal.      Palpations: Abdomen is soft.   Musculoskeletal:         General: No swelling or tenderness. Normal range of motion.      Cervical back: Normal range of motion. No tenderness.      Right lower leg: No edema.      Left lower leg: No edema.   Lymphadenopathy:      Cervical: No cervical " adenopathy.   Skin:     General: Skin is warm and dry.   Neurological:      General: No focal deficit present.      Mental Status: She is alert and oriented to person, place, and time.   Psychiatric:         Mood and Affect: Mood normal.         Behavior: Behavior normal.         Thought Content: Thought content normal.         Judgment: Judgment normal.

## 2024-04-08 DIAGNOSIS — I10 ESSENTIAL HYPERTENSION: ICD-10-CM

## 2024-04-08 RX ORDER — AMLODIPINE BESYLATE 5 MG/1
5 TABLET ORAL
Qty: 90 TABLET | Refills: 3 | Status: SHIPPED | OUTPATIENT
Start: 2024-04-08

## 2024-04-13 LAB
BASOPHILS # BLD AUTO: 0 X10E3/UL (ref 0–0.2)
BASOPHILS NFR BLD AUTO: 1 %
EOSINOPHIL # BLD AUTO: 0.1 X10E3/UL (ref 0–0.4)
EOSINOPHIL NFR BLD AUTO: 1 %
ERYTHROCYTE [DISTWIDTH] IN BLOOD BY AUTOMATED COUNT: 12.7 % (ref 11.6–15.4)
HBA1C MFR BLD: 6.7 % (ref 4.8–5.6)
HCT VFR BLD AUTO: 45.7 % (ref 37.5–51)
HGB BLD-MCNC: 15.4 G/DL (ref 13–17.7)
IMM GRANULOCYTES # BLD AUTO: 0 X10E3/UL (ref 0–0.1)
IMM GRANULOCYTES NFR BLD AUTO: 0 %
IMMATURE CELLS  115398: NORMAL
LYMPHOCYTES # BLD AUTO: 2.4 X10E3/UL (ref 0.7–3.1)
LYMPHOCYTES NFR BLD AUTO: 40 %
MCH RBC QN AUTO: 31.6 PG (ref 26.6–33)
MCHC RBC AUTO-ENTMCNC: 33.7 G/DL (ref 31.5–35.7)
MCV RBC AUTO: 94 FL (ref 79–97)
MONOCYTES # BLD AUTO: 0.4 X10E3/UL (ref 0.1–0.9)
MONOCYTES NFR BLD AUTO: 7 %
MORPHOLOGY BLD-IMP: NORMAL
NEUTROPHILS # BLD AUTO: 3 X10E3/UL (ref 1.4–7)
NEUTROPHILS NFR BLD AUTO: 51 %
NRBC BLD AUTO-RTO: NORMAL %
PLATELET # BLD AUTO: 197 X10E3/UL (ref 150–450)
RBC # BLD AUTO: 4.88 X10E6/UL (ref 4.14–5.8)
VIT B12 SERPL-MCNC: 305 PG/ML (ref 232–1245)
WBC # BLD AUTO: 5.9 X10E3/UL (ref 3.4–10.8)

## 2024-04-19 ENCOUNTER — OFFICE VISIT (OUTPATIENT)
Dept: MEDICAL GROUP | Age: 49
End: 2024-04-19
Payer: COMMERCIAL

## 2024-04-19 VITALS
BODY MASS INDEX: 36.29 KG/M2 | HEIGHT: 69 IN | WEIGHT: 245 LBS | DIASTOLIC BLOOD PRESSURE: 85 MMHG | HEART RATE: 85 BPM | SYSTOLIC BLOOD PRESSURE: 136 MMHG | TEMPERATURE: 98.4 F | OXYGEN SATURATION: 96 %

## 2024-04-19 DIAGNOSIS — E11.9 CONTROLLED TYPE 2 DIABETES MELLITUS WITHOUT COMPLICATION, WITHOUT LONG-TERM CURRENT USE OF INSULIN (HCC): ICD-10-CM

## 2024-04-19 DIAGNOSIS — E78.5 DYSLIPIDEMIA: ICD-10-CM

## 2024-04-19 DIAGNOSIS — I10 ESSENTIAL HYPERTENSION: ICD-10-CM

## 2024-04-19 PROCEDURE — 99214 OFFICE O/P EST MOD 30 MIN: CPT | Performed by: STUDENT IN AN ORGANIZED HEALTH CARE EDUCATION/TRAINING PROGRAM

## 2024-04-19 PROCEDURE — 3075F SYST BP GE 130 - 139MM HG: CPT | Performed by: STUDENT IN AN ORGANIZED HEALTH CARE EDUCATION/TRAINING PROGRAM

## 2024-04-19 PROCEDURE — 3079F DIAST BP 80-89 MM HG: CPT | Performed by: STUDENT IN AN ORGANIZED HEALTH CARE EDUCATION/TRAINING PROGRAM

## 2024-04-19 ASSESSMENT — ENCOUNTER SYMPTOMS
SENSORY CHANGE: 0
PALPITATIONS: 0
BRUISES/BLEEDS EASILY: 0
SHORTNESS OF BREATH: 0
ORTHOPNEA: 0
FEVER: 0
WEAKNESS: 0
BLOOD IN STOOL: 0
HEADACHES: 0
HEARTBURN: 0
DOUBLE VISION: 0
COUGH: 0
DEPRESSION: 0
BLURRED VISION: 0
CHILLS: 0
ABDOMINAL PAIN: 0
DIZZINESS: 0
WHEEZING: 0
BACK PAIN: 0

## 2024-04-19 ASSESSMENT — FIBROSIS 4 INDEX: FIB4 SCORE: 0.86

## 2024-04-19 NOTE — PATIENT INSTRUCTIONS
-Continue home medications  -We will discontinue metformin 500 mg dose and will start Trulicity  -No need to repeat labs  -We will repeat an A1c in the next visit in 3 months

## 2024-04-19 NOTE — PROGRESS NOTES
"Subjective:     CC: Follow-up med review    HPI:   Arvind presents today for med review follow-up.  Patient has a past medical history of diabetes, hypertension and obesity and dyslipidemia.  I was suspecting patient to follow-up until next month, however he changed his appointment.  Patient just had lab work done on April 12, routine labs showed normal CBC, normal B12, and unchanged A1c 6.7%.  Today patient complained that he was not able to obtain another medication that he was on for diabetes Trulicity, and instead he was given an extra 500 mg dose of metformin, unclear why this happened.  Patient states that he feels better when he is taking Trulicity and he asked me to place a new order for it.  I told Mr. David that his blood glucose is  Unchanged since his last visit.  His A1c has remained stable 6.7%.  I told him that he has a good blood glucose control and he may not need Trulicity.      ROS:  Review of Systems   Constitutional:  Negative for chills, fever and malaise/fatigue.   HENT:  Negative for nosebleeds and tinnitus.    Eyes:  Negative for blurred vision and double vision.   Respiratory:  Negative for cough, shortness of breath and wheezing.    Cardiovascular:  Negative for chest pain, palpitations, orthopnea and leg swelling.   Gastrointestinal:  Negative for abdominal pain, blood in stool, heartburn and melena.   Genitourinary:  Negative for dysuria and urgency.   Musculoskeletal:  Negative for back pain and joint pain.   Skin:  Negative for rash.   Neurological:  Negative for dizziness, sensory change, weakness and headaches.   Endo/Heme/Allergies:  Does not bruise/bleed easily.   Psychiatric/Behavioral:  Negative for depression and suicidal ideas.        Objective:     Exam:  /85 (BP Location: Right arm, Patient Position: Sitting, BP Cuff Size: Large adult)   Pulse 85   Temp 36.9 °C (98.4 °F) (Temporal)   Ht 1.753 m (5' 9\")   Wt 111 kg (245 lb)   SpO2 96%   BMI 36.18 kg/m²  Body mass " index is 36.18 kg/m².    Physical Exam  Vitals reviewed.   Constitutional:       General: He is not in acute distress.     Appearance: He is not ill-appearing.   HENT:      Head: Normocephalic and atraumatic.      Nose: No congestion.      Mouth/Throat:      Mouth: Mucous membranes are moist.      Pharynx: No oropharyngeal exudate or posterior oropharyngeal erythema.   Eyes:      General: No scleral icterus.     Extraocular Movements: Extraocular movements intact.      Pupils: Pupils are equal, round, and reactive to light.   Cardiovascular:      Rate and Rhythm: Normal rate and regular rhythm.      Pulses: Normal pulses.      Heart sounds: Normal heart sounds. No murmur heard.  Pulmonary:      Effort: Pulmonary effort is normal. No respiratory distress.      Breath sounds: Normal breath sounds. No wheezing.   Abdominal:      General: Bowel sounds are normal. There is no distension.      Palpations: Abdomen is soft.      Tenderness: There is no abdominal tenderness. There is no guarding or rebound.   Musculoskeletal:      Cervical back: Normal range of motion and neck supple. No rigidity.      Right lower leg: No edema.      Left lower leg: No edema.   Skin:     General: Skin is warm.      Capillary Refill: Capillary refill takes less than 2 seconds.      Findings: No bruising or erythema.   Neurological:      General: No focal deficit present.      Mental Status: He is alert.      Cranial Nerves: No cranial nerve deficit.      Sensory: No sensory deficit.   Psychiatric:         Mood and Affect: Mood normal.         Behavior: Behavior normal.         Labs: Reviewed    Assessment & Plan:     48 y.o. male with the following -     1. Controlled type 2 diabetes mellitus without complication, without long-term current use of insulin ([facility])  -Chronic, stable  -Patient currently only on metformin 800 mg twice daily and an extra dose of 500 mg in the morning  -Patient complained about current regimen and would like to go  back on Trulicity  -I removed the metformin 100 mg and I will prescribe again Trulicity  -Unclear why this medication fell off his list  -I told Mr. David that his blood glucose has not changed at all his A1c is 6.7 unchanged from labs in January  -We will repeat A1c in 3 months  - Dulaglutide 0.75 MG/0.5ML Solution Pen-injector; Inject 0.5 mL under the skin every 7 days.  Dispense: 0.5 mL; Refill: 8    2. Essential hypertension  -Chronic, stable  -Currently o amlodipine 5 mg daily  -Encouraged to exercise and weight loss  -Monitor BP at home at least 3 times a week if still not possible  -Low-salt diet  -Continue same therapy    3. Dyslipidemia  -Chronic, stable  -Currently on Lipitor 10 mg daily  -Lipid profile in January only remarkable for borderline low HDL but otherwise LDL at goal less than 70  -Continue same therapy      Return in about 3 months (around 7/20/2024) for DM.    Please note that this dictation was created using voice recognition software. I have made every reasonable attempt to correct obvious errors, but I expect that there are errors of grammar and possibly content that I did not discover before finalizing the note.

## 2024-07-25 ENCOUNTER — APPOINTMENT (OUTPATIENT)
Dept: MEDICAL GROUP | Facility: IMAGING CENTER | Age: 49
End: 2024-07-25
Payer: COMMERCIAL

## 2024-09-20 ENCOUNTER — OFFICE VISIT (OUTPATIENT)
Dept: MEDICAL GROUP | Facility: IMAGING CENTER | Age: 49
End: 2024-09-20
Payer: COMMERCIAL

## 2024-09-20 VITALS
BODY MASS INDEX: 36.46 KG/M2 | WEIGHT: 246.2 LBS | HEIGHT: 69 IN | TEMPERATURE: 97.9 F | SYSTOLIC BLOOD PRESSURE: 124 MMHG | RESPIRATION RATE: 18 BRPM | OXYGEN SATURATION: 97 % | HEART RATE: 82 BPM | DIASTOLIC BLOOD PRESSURE: 74 MMHG

## 2024-09-20 DIAGNOSIS — I10 ESSENTIAL HYPERTENSION: ICD-10-CM

## 2024-09-20 DIAGNOSIS — E66.9 OBESITY (BMI 30-39.9): ICD-10-CM

## 2024-09-20 DIAGNOSIS — E78.5 DYSLIPIDEMIA: ICD-10-CM

## 2024-09-20 DIAGNOSIS — L30.9 DERMATITIS: ICD-10-CM

## 2024-09-20 DIAGNOSIS — E55.9 VITAMIN D DEFICIENCY: ICD-10-CM

## 2024-09-20 DIAGNOSIS — E11.9 CONTROLLED TYPE 2 DIABETES MELLITUS WITHOUT COMPLICATION, WITHOUT LONG-TERM CURRENT USE OF INSULIN (HCC): ICD-10-CM

## 2024-09-20 DIAGNOSIS — Z00.00 HEALTHCARE MAINTENANCE: ICD-10-CM

## 2024-09-20 PROBLEM — R74.8 ELEVATED ALKALINE PHOSPHATASE LEVEL: Status: RESOLVED | Noted: 2021-01-14 | Resolved: 2024-09-20

## 2024-09-20 LAB
GLUCOSE BLD-MCNC: 109 MG/DL (ref 65–99)
HBA1C MFR BLD: 7.4 % (ref ?–5.8)
POCT INT CON NEG: NEGATIVE
POCT INT CON POS: POSITIVE

## 2024-09-20 PROCEDURE — 3078F DIAST BP <80 MM HG: CPT | Performed by: STUDENT IN AN ORGANIZED HEALTH CARE EDUCATION/TRAINING PROGRAM

## 2024-09-20 PROCEDURE — 82962 GLUCOSE BLOOD TEST: CPT | Performed by: STUDENT IN AN ORGANIZED HEALTH CARE EDUCATION/TRAINING PROGRAM

## 2024-09-20 PROCEDURE — 83036 HEMOGLOBIN GLYCOSYLATED A1C: CPT | Performed by: STUDENT IN AN ORGANIZED HEALTH CARE EDUCATION/TRAINING PROGRAM

## 2024-09-20 PROCEDURE — 3074F SYST BP LT 130 MM HG: CPT | Performed by: STUDENT IN AN ORGANIZED HEALTH CARE EDUCATION/TRAINING PROGRAM

## 2024-09-20 PROCEDURE — 99214 OFFICE O/P EST MOD 30 MIN: CPT | Performed by: STUDENT IN AN ORGANIZED HEALTH CARE EDUCATION/TRAINING PROGRAM

## 2024-09-20 RX ORDER — TRIAMCINOLONE ACETONIDE 1 MG/G
1 CREAM TOPICAL 2 TIMES DAILY
Qty: 15 G | Refills: 0 | Status: SHIPPED | OUTPATIENT
Start: 2024-09-20

## 2024-09-20 RX ORDER — BLOOD SUGAR DIAGNOSTIC
1 STRIP MISCELLANEOUS DAILY
Qty: 90 STRIP | Refills: 3 | Status: SHIPPED | OUTPATIENT
Start: 2024-09-20

## 2024-09-20 ASSESSMENT — FIBROSIS 4 INDEX: FIB4 SCORE: 0.87

## 2024-09-20 NOTE — PATIENT INSTRUCTIONS
Thank you for choosing [facility]. It was a pleasure meeting you today.     Take care!  [facility]

## 2024-09-20 NOTE — PROGRESS NOTES
Subjective:       CC:   Chief Complaint   Patient presents with    Establish Care     Previous PCP pt states none    Rash     On left leg, not sure what from. X1 mon       HPI:         Arvind is a 49 y.o. male is new to me and is here today to establish care. Previous PCP was Dr. Sidhu.  Pt would like to discuss the following today    Diabetes: chronic, reports he was diagnosed with DM 2-3 years ago. He's compliant with meds- Trulicity and Metformin. However, admits he was not injecting Trulicity weekly because he was afraid he was going to run out. Glucose at home is 100s and 200s fasting. Last eye exam was over a year ago.     HTN: chronic, managed with Amlodipine 5mg daily.     Dermatitis: onset was one month ago.Denies precipitating events. Denies food allergies. Reports he might have had an allergic reaction to shrimp because he developed abdominal bloating after consuming shrimp. Denies recent changes in his skin products. Denies new meds. Denies new pets.  Rash is not pruritic or painful.        ROS:   Denies CP, SOB, palpitations, neuropathy, fever, and chills.     Patient Active Problem List    Diagnosis Date Noted    Onychomycosis of right great toe 06/14/2023    Long term (current) use of oral hypoglycemic drugs 08/10/2020    Vitamin D deficiency 08/04/2020    Dyslipidemia 07/14/2020    Controlled type 2 diabetes mellitus without complication, without long-term current use of insulin (HCC) 06/12/2020    Obesity (BMI 30-39.9) 06/12/2020    Essential hypertension 06/12/2020    Tinea pedis of both feet 06/12/2020       Past Medical History:   Diagnosis Date    Diabetes (HCC)     Hypertension        Current Outpatient Medications   Medication Sig Dispense Refill    metFORMIN (GLUCOPHAGE) 850 MG Tab Take 850 mg by mouth every day.      ONETOUCH VERIO strip 1 Strip by Other route every day. 90 Strip 3    triamcinolone acetonide (KENALOG) 0.1 % Cream Apply 1 Application topically 2 times a day. 15 g 0     Dulaglutide 0.75 MG/0.5ML Solution Pen-injector Inject 0.5 mL under the skin every 7 days. 2 mL 3    amLODIPine (NORVASC) 5 MG Tab TAKE 1 TABLET BY MOUTH EVERY DAY 90 Tablet 3    atorvastatin (LIPITOR) 10 MG Tab Take 1 Tablet by mouth every day. 90 Tablet 3    Lancets (ONETOUCH DELICA PLUS NPOTYH40A) Misc USE TO TEST BLOOD SUGAR ONCE A DAY IN EARLY MORNING BEFORE FIRST MEAL 100 Each 3    vitamin D (CHOLECALCIFEROL) 1000 Unit (25 mcg) Tab Take 2,000 Units by mouth every day.      Alcohol Swabs Wipe site with prep pad prior to injection. 100 Each 2    Blood Glucose Monitoring Suppl (ONETOUCH VERIO) w/Device Kit USE TO TEST BLOOD SUGAR ONCE A DAY IN EARLY MORNING BEFORE FIRST MEAL      Blood Glucose Meter Kit Test blood sugar as recommended by provider.Insurance preferred blood glucose monitoring kit. 1 Kit 0     No current facility-administered medications for this visit.       Allergies as of 2024    (No Known Allergies)       Social History     Socioeconomic History    Marital status: Single     Spouse name: Not on file    Number of children: 1    Years of education: Not on file    Highest education level: Not on file   Occupational History     Comment:    Tobacco Use    Smoking status: Former     Current packs/day: 0.00     Average packs/day: 0.5 packs/day for 10.0 years (5.0 ttl pk-yrs)     Types: Cigarettes     Start date:      Quit date: 2015     Years since quittin.7    Smokeless tobacco: Never   Vaping Use    Vaping status: Never Used   Substance and Sexual Activity    Alcohol use: Not Currently    Drug use: Never    Sexual activity: Yes     Partners: Female   Other Topics Concern    Not on file   Social History Narrative    Not on file     Social Determinants of Health     Financial Resource Strain: Not on file   Food Insecurity: Not on file   Transportation Needs: Not on file   Physical Activity: Not on file   Stress: Not on file   Social Connections: Not on file   Intimate Partner  "Violence: Not on file   Housing Stability: Not on file       Family History   Problem Relation Age of Onset    No Known Problems Mother     Hypertension Father     Hypertension Brother     Diabetes Brother     Hypertension Brother     Diabetes Brother     Cancer Neg Hx        History reviewed. No pertinent surgical history.        Objective:     /74 (BP Location: Left arm, Patient Position: Sitting, BP Cuff Size: Adult)   Pulse 82   Temp 36.6 °C (97.9 °F) (Temporal)   Resp 18   Ht 1.753 m (5' 9\")   Wt 112 kg (246 lb 3.2 oz)   SpO2 97%   BMI 36.36 kg/m²     Physical Exam  Vitals reviewed.   Constitutional:       General: He is not in acute distress.     Appearance: Normal appearance.   HENT:      Head: Normocephalic and atraumatic.   Eyes:      General: No scleral icterus.  Neck:      Thyroid: No thyroid mass or thyromegaly.   Cardiovascular:      Rate and Rhythm: Normal rate and regular rhythm.      Pulses: Normal pulses.      Heart sounds: Normal heart sounds. No murmur heard.  Pulmonary:      Effort: Pulmonary effort is normal. No respiratory distress.      Breath sounds: Normal breath sounds. No wheezing.   Abdominal:      General: Bowel sounds are normal. There is no distension.      Palpations: Abdomen is soft. There is no mass.      Tenderness: There is no abdominal tenderness.   Musculoskeletal:         General: No swelling. Normal range of motion.      Cervical back: Normal range of motion and neck supple. No tenderness.   Lymphadenopathy:      Cervical: No cervical adenopathy.   Skin:     General: Skin is warm and dry.      Coloration: Skin is not jaundiced.      Findings: Rash present. No bruising.      Comments: Erythematous macules present in right lower extremity   Neurological:      General: No focal deficit present.      Mental Status: He is alert and oriented to person, place, and time.      Gait: Gait normal.   Psychiatric:         Mood and Affect: Mood normal.         Behavior: " Behavior normal.         Thought Content: Thought content normal.         Judgment: Judgment normal.          Assessment and Plan:     1. Controlled type 2 diabetes mellitus without complication, without long-term current use of insulin (HCC)  Chronic and ongoing. A1C went up to 7.4. Discussed medication adjustments but patient would like to continue with same regimen.  Therefore, he will continue with dulaglutide 0.75 mg weekly and metformin 850 mg daily.  Recommend follow-up in 3 months.  Patient is due for TSH, CMP, and lipid panel.  Discussed lifestyle modifications.  - ONETOUCH VERIO strip; 1 Strip by Other route every day.  Dispense: 90 Strip; Refill: 3  - TSH WITH REFLEX TO FT4; Future  - Comp Metabolic Panel; Future  - POCT  A1C  - POCT Glucose  - Referral to Ophthalmology  - Dulaglutide 0.75 MG/0.5ML Solution Pen-injector; Inject 0.5 mL under the skin every 7 days.  Dispense: 2 mL; Refill: 3   Latest Reference Range & Units 09/20/24 14:02   Glycohemoglobin 5.8 % 7.4 !   !: Data is abnormal   Latest Reference Range & Units 09/20/24 14:01   Glucose - Accu-Ck 65 - 99 mg/dL 109 !   !: Data is abnormal    2. Essential hypertension  Chronic and well-controlled.  Will continue with amlodipine 5 mg daily.  - TSH WITH REFLEX TO FT4; Future  - Comp Metabolic Panel; Future    3. Dyslipidemia  Chronic and stable.  LDL is at goal.  Will continue with atorvastatin 10 mg nightly.  Will repeat lipid panel.  - TSH WITH REFLEX TO FT4; Future  - Lipid Profile; Future  - Comp Metabolic Panel; Future    4. Obesity (BMI 30-39.9)  Chronic and stable.  Patient does not want to increase dulaglutide dose.  Will continue with current dose as mentioned above.  Recommend a diet low in fat/carbohydrates and exercise for at least 30 minutes daily or most days of the week.  - TSH WITH REFLEX TO FT4; Future  - Comp Metabolic Panel; Future    5. Vitamin D deficiency  Chronic and stable.  Will continue with over-the-counter vitamin D3 2000  IUs daily.  Will recheck levels.  - VITAMIN D,25 HYDROXY (DEFICIENCY); Future    6. Dermatitis  Acute.  Unknown etiology.  Will trial triamcinolone 0.1% cream for a few weeks.  If rash does not resolve, recommend referral to dermatology.  - triamcinolone acetonide (KENALOG) 0.1 % Cream; Apply 1 Application topically 2 times a day.  Dispense: 15 g; Refill: 0    7. Healthcare maintenance  Patient is up-to-date with colorectal cancer screening.  Strongly recommend flu shot but he declined today.  Recommend zoster and COVID-19 immunizations at the pharmacy.  Otherwise, he is up-to-date.      Diagnosis and treatment plan explained to pt. Counseled pt on new medication(s) and potential side effects. Pt agreed with treatment plan and verbalized understanding.         Return in about 3 months (around 12/20/2024) for Diabetes.     Please note that this dictation was created using voice recognition software. I have made every reasonable attempt to correct obvious errors, but I expect that there are errors of grammar and possibly content that I did not discover before finalizing the note.    Lori De León PA-C  [facility]

## 2024-12-02 DIAGNOSIS — E11.9 CONTROLLED TYPE 2 DIABETES MELLITUS WITHOUT COMPLICATION, WITHOUT LONG-TERM CURRENT USE OF INSULIN (HCC): ICD-10-CM

## 2024-12-02 NOTE — TELEPHONE ENCOUNTER
Received request via: Patient    Was the patient seen in the last year in this department? Yes    Does the patient have an active prescription (recently filled or refills available) for medication(s) requested? No    Pharmacy Name: [id number]    Does the patient have prison Plus and need 100-day supply? (This applies to ALL medications) Patient does not have SCP

## 2024-12-13 DIAGNOSIS — E11.9 CONTROLLED TYPE 2 DIABETES MELLITUS WITHOUT COMPLICATION, WITHOUT LONG-TERM CURRENT USE OF INSULIN (HCC): ICD-10-CM

## 2024-12-13 RX ORDER — DULAGLUTIDE 0.75 MG/.5ML
0.75 INJECTION, SOLUTION SUBCUTANEOUS
Qty: 2 ML | Refills: 3 | Status: SHIPPED | OUTPATIENT
Start: 2024-12-13 | End: 2024-12-31

## 2024-12-13 NOTE — TELEPHONE ENCOUNTER
Received request via: Patient    Was the patient seen in the last year in this department? Yes    Does the patient have an active prescription (recently filled or refills available) for medication(s) requested? No    Pharmacy Name: [id number]    Does the patient have assisted Plus and need 100-day supply? (This applies to ALL medications) Patient does not have SCP

## 2024-12-31 ENCOUNTER — HOSPITAL ENCOUNTER (OUTPATIENT)
Facility: MEDICAL CENTER | Age: 49
End: 2024-12-31
Attending: STUDENT IN AN ORGANIZED HEALTH CARE EDUCATION/TRAINING PROGRAM
Payer: COMMERCIAL

## 2024-12-31 ENCOUNTER — APPOINTMENT (OUTPATIENT)
Dept: MEDICAL GROUP | Facility: IMAGING CENTER | Age: 49
End: 2024-12-31
Payer: COMMERCIAL

## 2024-12-31 VITALS
OXYGEN SATURATION: 96 % | SYSTOLIC BLOOD PRESSURE: 142 MMHG | HEIGHT: 69 IN | TEMPERATURE: 97.1 F | HEART RATE: 82 BPM | DIASTOLIC BLOOD PRESSURE: 86 MMHG | WEIGHT: 241 LBS | BODY MASS INDEX: 35.7 KG/M2 | RESPIRATION RATE: 16 BRPM

## 2024-12-31 DIAGNOSIS — E11.65 UNCONTROLLED TYPE 2 DIABETES MELLITUS WITH HYPERGLYCEMIA (HCC): ICD-10-CM

## 2024-12-31 DIAGNOSIS — E55.9 VITAMIN D DEFICIENCY: ICD-10-CM

## 2024-12-31 DIAGNOSIS — L30.9 DERMATITIS: ICD-10-CM

## 2024-12-31 DIAGNOSIS — E78.5 DYSLIPIDEMIA: ICD-10-CM

## 2024-12-31 DIAGNOSIS — E66.9 OBESITY (BMI 30-39.9): ICD-10-CM

## 2024-12-31 DIAGNOSIS — I10 ESSENTIAL HYPERTENSION: ICD-10-CM

## 2024-12-31 LAB
APPEARANCE UR: CLEAR
BILIRUB UR STRIP-MCNC: NEGATIVE MG/DL
COLOR UR AUTO: YELLOW
CREAT UR-MCNC: 74.61 MG/DL
GLUCOSE BLD-MCNC: 180 MG/DL (ref 65–99)
GLUCOSE UR STRIP.AUTO-MCNC: NEGATIVE MG/DL
HBA1C MFR BLD: 7.3 % (ref ?–5.8)
KETONES UR STRIP.AUTO-MCNC: NEGATIVE MG/DL
LEUKOCYTE ESTERASE UR QL STRIP.AUTO: NEGATIVE
MICROALBUMIN UR-MCNC: <1.2 MG/DL
MICROALBUMIN/CREAT UR: NORMAL MG/G (ref 0–30)
NITRITE UR QL STRIP.AUTO: NEGATIVE
PH UR STRIP.AUTO: 6.5 [PH] (ref 5–8)
POCT INT CON NEG: NEGATIVE
POCT INT CON POS: POSITIVE
PROT UR QL STRIP: NEGATIVE MG/DL
RBC UR QL AUTO: NEGATIVE
SP GR UR STRIP.AUTO: 1.01
UROBILINOGEN UR STRIP-MCNC: 0.2 MG/DL

## 2024-12-31 PROCEDURE — 82043 UR ALBUMIN QUANTITATIVE: CPT

## 2024-12-31 PROCEDURE — 82570 ASSAY OF URINE CREATININE: CPT

## 2024-12-31 RX ORDER — TRIAMCINOLONE ACETONIDE 1 MG/G
1 CREAM TOPICAL 2 TIMES DAILY
Qty: 15 G | Refills: 0 | Status: SHIPPED | OUTPATIENT
Start: 2024-12-31

## 2024-12-31 ASSESSMENT — FIBROSIS 4 INDEX: FIB4 SCORE: 0.87

## 2024-12-31 NOTE — PROGRESS NOTES
"Subjective:     CC:   Chief Complaint   Patient presents with    Follow-Up       HPI:     Verbal consent was acquired by the patient to use [employer] ambient listening note generation during this visit Yes      martin Holguin, 49 y.o., male,  presents today to discuss:     History of Present Illness      Diabetes   The patient is currently on Trulicity 0.75 mg subcutaneous injection once weekly and Metformin 850 mg orally twice daily. He reports mild hyperglycemia, with blood glucose levels approximately 140 mg/dL preprandially. He notes that a recent vacation may have disrupted his dietary and physical activity regimen.    Hypertension  Today's blood pressure reading is 142 mmHg, indicating mild hypertension. The patient took his morning dose of amlodipine postprandially. He reports that his home blood pressure readings typically average around 125 mmHg.    Dermatitis  The patient has a history of dermatological issues, specifically skin rashes, for which he was prescribed triamcinolone cream. He applied the cream for 2-3 weeks, resulting in significant resolution of the rashes, although some minor residual lesions persist.         ROS:  Denies CP, SOB, palpitations, neuropathy, and polyuria.     Medications, allergies, past medical history, family history, surgical history, and social history documented in chart and reviewed by me.       Objective:   Exam:  BP (!) 142/88 (BP Location: Left arm, Patient Position: Sitting, BP Cuff Size: Adult)   Pulse 82   Temp 36.2 °C (97.1 °F) (Temporal)   Resp 16   Ht 1.753 m (5' 9\")   Wt 109 kg (241 lb)   SpO2 96%   BMI 35.59 kg/m²      Physical Exam  Vitals reviewed.   Constitutional:       General: He is not in acute distress.     Appearance: Normal appearance. He is not ill-appearing or toxic-appearing.   HENT:      Head: Normocephalic and atraumatic.   Eyes:      General: No scleral icterus.        Right eye: No discharge.         Left eye: " No discharge.   Cardiovascular:      Rate and Rhythm: Normal rate and regular rhythm.      Heart sounds: No murmur heard.  Pulmonary:      Effort: Pulmonary effort is normal. No respiratory distress.      Breath sounds: Normal breath sounds.   Musculoskeletal:         General: Normal range of motion.   Skin:     Comments: Mild erythema present in RLE   Neurological:      General: No focal deficit present.      Mental Status: He is alert and oriented to person, place, and time.      Gait: Gait normal.   Psychiatric:         Mood and Affect: Mood normal.         Behavior: Behavior normal.         Thought Content: Thought content normal.         Judgment: Judgment normal.              Assessment & Plan:       Assessment & Plan    1. Uncontrolled type 2 diabetes mellitus with hyperglycemia (HCC)  Chronic, uncontrolled. A1C is 7.3 today.  Trulicity increased to 1.5 mg weekly and metformin increased to 1000 mg twice daily with meals.  Encouraged healthy diet and exercise.  Recommend to return to clinic in 3 months or sooner if needed.  Will update screening labs before next appointment in 3 months.  Will request eye exam records from [facility].  POCT urinalysis is normal.  Will check microalbumin to creatinine ratio  - Microalbumin Creat Ratio Urine (Clinic Collect); Future  - POCT Urinalysis  - POCT  A1C  - POCT Glucose  - Lipid Profile; Future  - TSH WITH REFLEX TO FT4; Future  - CBC WITH DIFFERENTIAL; Future  - Comp Metabolic Panel; Future  - Dulaglutide 1.5 MG/0.5ML Solution Auto-injector; Inject 1.5 mg under the skin every 7 days.  Dispense: 2 mL; Refill: 2  - metformin (GLUCOPHAGE) 1000 MG tablet; Take 1 Tablet by mouth 2 times a day with meals.  Dispense: 180 Tablet; Refill: 3   Latest Reference Range & Units 12/31/24 09:56   Glycohemoglobin <=5.8 % 7.3 !   !: Data is abnormal   Latest Reference Range & Units 12/31/24 09:56   Glucose - Accu-Ck 65 - 99 mg/dL 180 !   !: Data is abnormal   Latest Reference Range &  Units 12/31/24 10:17   POC Color Negative  Yellow   POC Appearance Negative  Clear   POC Specific Gravity <1.005 - >1.030  1.015   POC Urine PH 5.0 - 8.0  6.5   POC Glucose Negative mg/dL Negative   POC Ketones Negative mg/dL Negative   POC Protein Negative mg/dL Negative   POC Nitrites Negative  Negative   POC Leukocyte Esterase Negative  Negative   POC Blood Negative  Negative   POC Bilirubin Negative mg/dL Negative   POC Urobiligen Negative (0.2) mg/dL 0.2       2. Essential hypertension  Chronic, unstable today.  BP is 142/96 today.  Patient has not taken his antihypertensive today.  Per patient his blood pressure is normal at home less than 130/90.  Will continue with amlodipine 5 mg daily.  - Lipid Profile; Future  - TSH WITH REFLEX TO FT4; Future  - CBC WITH DIFFERENTIAL; Future  - Comp Metabolic Panel; Future  - Dulaglutide 1.5 MG/0.5ML Solution Auto-injector; Inject 1.5 mg under the skin every 7 days.  Dispense: 2 mL; Refill: 2    3. Dyslipidemia  Chronic, well-controlled.  Will update lipid panel before next appointment in 3 months.  Will continue with atorvastatin 10 mg nightly.  - Lipid Profile; Future  - TSH WITH REFLEX TO FT4; Future  - CBC WITH DIFFERENTIAL; Future  - Comp Metabolic Panel; Future    4. Dermatitis  Chronic, improving.  Will continue with triamcinolone 0.1 cream percent no more than 3 weeks straight. If the rash does not resolve, a referral to a dermatologist will be considered.  - triamcinolone acetonide (KENALOG) 0.1 % Cream; Apply 1 Application topically 2 times a day.  Dispense: 15 g; Refill: 0    5. Obesity (BMI 30-39.9)  Chronic, stable.  Discussed lifestyle modifications to help with weight loss.  Trulicity was increased to 1.5 mg weekly.  - Lipid Profile; Future  - TSH WITH REFLEX TO FT4; Future  - CBC WITH DIFFERENTIAL; Future  - Comp Metabolic Panel; Future  - Dulaglutide 1.5 MG/0.5ML Solution Auto-injector; Inject 1.5 mg under the skin every 7 days.  Dispense: 2 mL; Refill:  2    6. Vitamin D deficiency  Chronic, unknown status.  Will check levels.  Will continue with vitamin D3 1000 IUs daily.  - VITAMIN D,25 HYDROXY (DEFICIENCY); Future                Diagnosis and treatment plan explained to pt. Counseled pt on new medication(s) and potential side effects. Pt agreed with treatment plan and verbalized understanding.     Return in about 3 months (around 3/31/2025) for diabetes f/u.     Please note that this dictation was created using voice recognition software. I have made every reasonable attempt to correct obvious errors, but I expect that there are errors of grammar and possibly content that I did not discover before finalizing the note.    Lori De León PA-C  [facility]

## 2025-02-19 DIAGNOSIS — E78.5 DYSLIPIDEMIA: ICD-10-CM

## 2025-02-19 DIAGNOSIS — E11.9 CONTROLLED TYPE 2 DIABETES MELLITUS WITHOUT COMPLICATION, WITHOUT LONG-TERM CURRENT USE OF INSULIN (HCC): ICD-10-CM

## 2025-02-19 RX ORDER — ATORVASTATIN CALCIUM 10 MG/1
10 TABLET, FILM COATED ORAL
Qty: 90 TABLET | Refills: 3 | Status: SHIPPED | OUTPATIENT
Start: 2025-02-19

## 2025-03-12 ENCOUNTER — TELEPHONE (OUTPATIENT)
Dept: MEDICAL GROUP | Facility: IMAGING CENTER | Age: 50
End: 2025-03-12
Payer: COMMERCIAL

## 2025-03-12 NOTE — TELEPHONE ENCOUNTER
LVM to reschedule a patient's appointment on 04/04/2025 with Lori De León . Lori De León  will be out-of-office at that time. Patient was instructed to call [phone] or use [employer] application to reschedule at their earliest convenience.

## 2025-04-07 DIAGNOSIS — I10 ESSENTIAL HYPERTENSION: ICD-10-CM

## 2025-04-07 RX ORDER — AMLODIPINE BESYLATE 5 MG/1
5 TABLET ORAL
Qty: 90 TABLET | Refills: 3 | Status: SHIPPED | OUTPATIENT
Start: 2025-04-07

## 2025-04-19 LAB
25(OH)D3+25(OH)D2 SERPL-MCNC: 48.4 NG/ML (ref 30–100)
ALBUMIN SERPL-MCNC: 4.5 G/DL (ref 4.1–5.1)
ALP SERPL-CCNC: 133 IU/L (ref 44–121)
ALT SERPL-CCNC: 32 IU/L (ref 0–44)
AST SERPL-CCNC: 21 IU/L (ref 0–40)
BASOPHILS # BLD AUTO: 0 X10E3/UL (ref 0–0.2)
BASOPHILS NFR BLD AUTO: 1 %
BILIRUB SERPL-MCNC: 0.9 MG/DL (ref 0–1.2)
BUN SERPL-MCNC: 10 MG/DL (ref 6–24)
BUN/CREAT SERPL: 14 (ref 9–20)
CALCIUM SERPL-MCNC: 9.2 MG/DL (ref 8.7–10.2)
CHLORIDE SERPL-SCNC: 103 MMOL/L (ref 96–106)
CHOLEST SERPL-MCNC: 116 MG/DL (ref 100–199)
CO2 SERPL-SCNC: 23 MMOL/L (ref 20–29)
CREAT SERPL-MCNC: 0.72 MG/DL (ref 0.76–1.27)
EGFRCR SERPLBLD CKD-EPI 2021: 111 ML/MIN/1.73
EOSINOPHIL # BLD AUTO: 0.1 X10E3/UL (ref 0–0.4)
EOSINOPHIL NFR BLD AUTO: 1 %
ERYTHROCYTE [DISTWIDTH] IN BLOOD BY AUTOMATED COUNT: 13.1 % (ref 11.6–15.4)
GLOBULIN SER CALC-MCNC: 2.5 G/DL (ref 1.5–4.5)
GLUCOSE SERPL-MCNC: 131 MG/DL (ref 70–99)
HCT VFR BLD AUTO: 44.5 % (ref 37.5–51)
HDLC SERPL-MCNC: 34 MG/DL
HGB BLD-MCNC: 15.3 G/DL (ref 13–17.7)
IMM GRANULOCYTES # BLD AUTO: 0 X10E3/UL (ref 0–0.1)
IMM GRANULOCYTES NFR BLD AUTO: 0 %
IMMATURE CELLS  115398: NORMAL
LDL CALC COMMENT:: ABNORMAL
LDLC SERPL CALC-MCNC: 69 MG/DL (ref 0–99)
LYMPHOCYTES # BLD AUTO: 2.6 X10E3/UL (ref 0.7–3.1)
LYMPHOCYTES NFR BLD AUTO: 43 %
MCH RBC QN AUTO: 31.7 PG (ref 26.6–33)
MCHC RBC AUTO-ENTMCNC: 34.4 G/DL (ref 31.5–35.7)
MCV RBC AUTO: 92 FL (ref 79–97)
MONOCYTES # BLD AUTO: 0.5 X10E3/UL (ref 0.1–0.9)
MONOCYTES NFR BLD AUTO: 8 %
MORPHOLOGY BLD-IMP: NORMAL
NEUTROPHILS # BLD AUTO: 2.9 X10E3/UL (ref 1.4–7)
NEUTROPHILS NFR BLD AUTO: 47 %
NRBC BLD AUTO-RTO: NORMAL %
PLATELET # BLD AUTO: 223 X10E3/UL (ref 150–450)
POTASSIUM SERPL-SCNC: 3.9 MMOL/L (ref 3.5–5.2)
PROT SERPL-MCNC: 7 G/DL (ref 6–8.5)
RBC # BLD AUTO: 4.83 X10E6/UL (ref 4.14–5.8)
SODIUM SERPL-SCNC: 139 MMOL/L (ref 134–144)
TRIGL SERPL-MCNC: 62 MG/DL (ref 0–149)
TSH SERPL DL<=0.005 MIU/L-ACNC: 1.26 UIU/ML (ref 0.45–4.5)
VLDLC SERPL CALC-MCNC: 13 MG/DL (ref 5–40)
WBC # BLD AUTO: 6.1 X10E3/UL (ref 3.4–10.8)

## 2025-04-22 ENCOUNTER — RESULTS FOLLOW-UP (OUTPATIENT)
Dept: MEDICAL GROUP | Facility: IMAGING CENTER | Age: 50
End: 2025-04-22
Payer: COMMERCIAL

## 2025-04-22 DIAGNOSIS — E66.9 OBESITY (BMI 30-39.9): ICD-10-CM

## 2025-04-22 DIAGNOSIS — E55.9 VITAMIN D DEFICIENCY: ICD-10-CM

## 2025-04-22 DIAGNOSIS — E11.65 UNCONTROLLED TYPE 2 DIABETES MELLITUS WITH HYPERGLYCEMIA (HCC): ICD-10-CM

## 2025-04-22 DIAGNOSIS — I10 ESSENTIAL HYPERTENSION: ICD-10-CM

## 2025-04-22 DIAGNOSIS — E78.5 DYSLIPIDEMIA: ICD-10-CM

## 2025-04-28 ENCOUNTER — APPOINTMENT (OUTPATIENT)
Dept: MEDICAL GROUP | Facility: IMAGING CENTER | Age: 50
End: 2025-04-28
Payer: COMMERCIAL

## 2025-04-28 ENCOUNTER — RESULTS FOLLOW-UP (OUTPATIENT)
Dept: MEDICAL GROUP | Facility: IMAGING CENTER | Age: 50
End: 2025-04-28

## 2025-04-28 VITALS
HEART RATE: 72 BPM | SYSTOLIC BLOOD PRESSURE: 126 MMHG | DIASTOLIC BLOOD PRESSURE: 86 MMHG | TEMPERATURE: 97.7 F | RESPIRATION RATE: 15 BRPM | OXYGEN SATURATION: 97 % | WEIGHT: 248.6 LBS | HEIGHT: 69 IN | BODY MASS INDEX: 36.82 KG/M2

## 2025-04-28 DIAGNOSIS — R74.8 ELEVATED ALKALINE PHOSPHATASE LEVEL: ICD-10-CM

## 2025-04-28 DIAGNOSIS — I10 ESSENTIAL HYPERTENSION: ICD-10-CM

## 2025-04-28 DIAGNOSIS — E11.9 CONTROLLED TYPE 2 DIABETES MELLITUS WITHOUT COMPLICATION, WITHOUT LONG-TERM CURRENT USE OF INSULIN (HCC): ICD-10-CM

## 2025-04-28 DIAGNOSIS — E66.9 OBESITY (BMI 30-39.9): ICD-10-CM

## 2025-04-28 DIAGNOSIS — B35.1 ONYCHOMYCOSIS: ICD-10-CM

## 2025-04-28 DIAGNOSIS — E78.5 DYSLIPIDEMIA: ICD-10-CM

## 2025-04-28 LAB
HBA1C MFR BLD: 6.8 % (ref ?–5.8)
POCT INT CON NEG: NEGATIVE
POCT INT CON POS: POSITIVE

## 2025-04-28 RX ORDER — TERBINAFINE HYDROCHLORIDE 250 MG/1
250 TABLET ORAL DAILY
Qty: 90 TABLET | Refills: 0 | Status: SHIPPED | OUTPATIENT
Start: 2025-04-28

## 2025-04-28 ASSESSMENT — PATIENT HEALTH QUESTIONNAIRE - PHQ9: CLINICAL INTERPRETATION OF PHQ2 SCORE: 0

## 2025-04-28 ASSESSMENT — FIBROSIS 4 INDEX: FIB4 SCORE: 0.83

## 2025-04-28 NOTE — PROGRESS NOTES
"Subjective:     CC:   Chief Complaint   Patient presents with    Follow-Up     Diabetes   Lab results 4/17/25       HPI:     Verbal consent was acquired by the patient to use [employer] ambient listening note generation during this visit Yes      Arvind CarterDugganmartin Riley, 49 y.o., male,  presents today to discuss:     History of Present Illness    Diabetes follow-up  The patient presents for follow-up regarding diabetes mellitus management. During the last visit in December, the patient's glycated hemoglobin (A1C) was 7.3%, with a target to reduce it to below 7%. The medication regimen was adjusted to include metformin 1000 mg twice daily and Trulicity 1.5mg a weekly injection. The patient reports adherence to taking metformin once daily and the injection once weekly. The patient reports occasional hyperphagia for sugary foods but acknowledges the necessity of limiting sugar intake for optimal diabetes management.     Dermatitis follow up  The patient previously received a topical cream for dermatitis, which resolved the condition. The patient reports no current dermatological issues or pruritus.     Onychomycosis   The patient reports a change in the color of a toenail, suggestive of onychomycosis. The patient has no history of antifungal therapy.        ROS:  Denies CP, SOB, and vision changes    Medications, allergies, past medical history, family history, surgical history, and social history documented in chart and reviewed by me.       Objective:   Exam:  /86 (BP Location: Right arm, Patient Position: Sitting, BP Cuff Size: Adult long)   Pulse 72   Temp 36.5 °C (97.7 °F) (Temporal)   Resp 15   Ht 1.753 m (5' 9\")   Wt 113 kg (248 lb 9.6 oz)   SpO2 97%   BMI 36.71 kg/m²      Physical Exam  Vitals reviewed.   Constitutional:       General: He is not in acute distress.     Appearance: Normal appearance.   HENT:      Head: Normocephalic and atraumatic.   Eyes:      General: No scleral " icterus.        Right eye: No discharge.         Left eye: No discharge.      Extraocular Movements: Extraocular movements intact.      Conjunctiva/sclera: Conjunctivae normal.      Pupils: Pupils are equal, round, and reactive to light.   Neck:      Thyroid: No thyroid mass or thyromegaly.      Vascular: No carotid bruit.   Cardiovascular:      Rate and Rhythm: Normal rate and regular rhythm.      Pulses: Normal pulses.           Dorsalis pedis pulses are 2+ on the right side and 2+ on the left side.        Posterior tibial pulses are 2+ on the right side and 2+ on the left side.      Heart sounds: Normal heart sounds. No murmur heard.  Pulmonary:      Effort: Pulmonary effort is normal. No respiratory distress.      Breath sounds: Normal breath sounds. No wheezing.   Abdominal:      General: Bowel sounds are normal. There is no distension.      Palpations: Abdomen is soft. There is no mass.      Tenderness: There is no abdominal tenderness.   Musculoskeletal:         General: No swelling. Normal range of motion.      Cervical back: Normal range of motion and neck supple. No tenderness.      Right foot: Normal. Normal range of motion. No swelling, deformity or tenderness. Normal pulse.      Left foot: Normal. Normal range of motion. No swelling, deformity or tenderness. Normal pulse.   Feet:      Right foot:      Protective Sensation: 10 sites tested.  10 sites sensed.      Skin integrity: Skin integrity normal. No ulcer, blister, erythema, warmth, dry skin or fissure.      Toenail Condition: Right toenails are abnormally thick.      Left foot:      Protective Sensation: 10 sites tested.  10 sites sensed.      Skin integrity: Skin integrity normal. No ulcer, blister, erythema, warmth, dry skin or fissure.      Toenail Condition: Left toenails are abnormally thick.   Lymphadenopathy:      Cervical: No cervical adenopathy.   Skin:     General: Skin is warm and dry.      Coloration: Skin is not jaundiced.       Findings: No bruising.   Neurological:      General: No focal deficit present.      Mental Status: He is alert and oriented to person, place, and time.      Gait: Gait normal.   Psychiatric:         Mood and Affect: Mood normal.         Behavior: Behavior normal.         Thought Content: Thought content normal.         Judgment: Judgment normal.            Assessment & Plan:       Assessment & Plan      1. Controlled type 2 diabetes mellitus without complication, without long-term current use of insulin (HCC)  Chronic, established condition.  Well-controlled.  The patient's A1C has improved from 7.3% to 6.8%. The current medication regimen of metformin 1000 mg once a day and Trulicity 1.5 mg weekly injections will be continued. The patient is advised to limit sugar intake and consume more fish, nuts, peanuts, almonds, and avocados to improve HDL cholesterol levels.  POCT retinal eye exam conducted today.  Monofilament test was conducted today and was normal.  Recommend follow-up in 6 months or sooner if needed.  - POCT A1C  - Diabetic Monofilament LE Exam  - POCT Retinal Eye Exam  - Dulaglutide 1.5 MG/0.5ML Solution Auto-injector; Inject 1.5 mg under the skin every 7 days.  Dispense: 6 mL; Refill: 3    2. Onychomycosis  Chronic, stable.  The patient reports a change in the color of a toenail, which may be due to a fungal infection. Terbinafine is prescribed for three months, with follow-up liver function tests to monitor for potential liver damage. The patient is advised to avoid alcohol while taking terbinafine.  - terbinafine (LAMISIL) 250 MG Tab; Take 1 Tablet by mouth every day.  Dispense: 90 Tablet; Refill: 0    3. Essential hypertension  Chronic, established condition.  Well-controlled.  Recommend to continue with amlodipine 5 mg.  - Dulaglutide 1.5 MG/0.5ML Solution Auto-injector; Inject 1.5 mg under the skin every 7 days.  Dispense: 6 mL; Refill: 3    4. Dyslipidemia  Chronic, established condition.  Stable.   Recommend consuming more omega-3's to help improve HDL.  Recommend to continue with atorvastatin 10 mg daily.   Latest Reference Range & Units 04/18/25 07:15   Cholesterol,Tot 100 - 199 mg/dL 116   Triglycerides 0 - 149 mg/dL 62   HDL >39 mg/dL 34 (L)   LDL Chol Calc (NIH) 0 - 99 mg/dL 69   VLDL Cholesterol Calc 5 - 40 mg/dL 13   (L): Data is abnormally low    5. Obesity (BMI 30-39.9)  Chronic, stable.  [facility].  - Dulaglutide 1.5 MG/0.5ML Solution Auto-injector; Inject 1.5 mg under the skin every 7 days.  Dispense: 6 mL; Refill: 3    6. Elevated alkaline phosphatase level  Acute.  Stable.  Alkaline phosphatase levels are slightly elevated at 133 U/L. The patient will be monitored, and further tests will be ordered if levels remain high to determine the cause.  Vitamin D is normal.  - HEPATIC FUNCTION PANEL; Future              Diagnosis and treatment plan explained to pt. Counseled pt on new medication(s) and potential side effects. Pt agreed with treatment plan and verbalized understanding.     Return in about 6 months (around 10/28/2025) for DM.     Please note that this dictation was created using voice recognition software. I have made every reasonable attempt to correct obvious errors, but I expect that there are errors of grammar and possibly content that I did not discover before finalizing the note.    Lori De León PA-C  [facility]

## 2025-06-02 DIAGNOSIS — I10 ESSENTIAL HYPERTENSION: ICD-10-CM

## 2025-06-02 DIAGNOSIS — E11.9 CONTROLLED TYPE 2 DIABETES MELLITUS WITHOUT COMPLICATION, WITHOUT LONG-TERM CURRENT USE OF INSULIN (HCC): ICD-10-CM

## 2025-06-02 DIAGNOSIS — E66.9 OBESITY (BMI 30-39.9): ICD-10-CM

## 2025-06-02 NOTE — TELEPHONE ENCOUNTER
Received request via: Patient    Was the patient seen in the last year in this department? Yes    Does the patient have an active prescription (recently filled or refills available) for medication(s) requested? No    Pharmacy Name: [id number]    Does the patient have group home Plus and need 100-day supply? (This applies to ALL medications) Patient does not have SCP